# Patient Record
Sex: FEMALE | Race: WHITE | NOT HISPANIC OR LATINO | Employment: OTHER | URBAN - METROPOLITAN AREA
[De-identification: names, ages, dates, MRNs, and addresses within clinical notes are randomized per-mention and may not be internally consistent; named-entity substitution may affect disease eponyms.]

---

## 2022-11-16 ENCOUNTER — HOSPITAL ENCOUNTER (INPATIENT)
Facility: HOSPITAL | Age: 67
LOS: 2 days | Discharge: HOME | DRG: 189 | End: 2022-11-18
Attending: EMERGENCY MEDICINE | Admitting: HOSPITALIST
Payer: MEDICARE

## 2022-11-16 DIAGNOSIS — J96.01 ACUTE HYPOXEMIC RESPIRATORY FAILURE: ICD-10-CM

## 2022-11-16 DIAGNOSIS — J18.9 PNEUMONIA OF BOTH LOWER LOBES DUE TO INFECTIOUS ORGANISM: Primary | ICD-10-CM

## 2022-11-16 DIAGNOSIS — A41.9 SEPSIS, DUE TO UNSPECIFIED ORGANISM, UNSPECIFIED WHETHER ACUTE ORGAN DYSFUNCTION PRESENT: ICD-10-CM

## 2022-11-16 DIAGNOSIS — R06.02 SHORTNESS OF BREATH: ICD-10-CM

## 2022-11-16 PROBLEM — Z86.39 HISTORY OF GRAVES' DISEASE: Status: ACTIVE | Noted: 2018-03-16

## 2022-11-16 PROBLEM — D50.9 IRON DEFICIENCY ANEMIA: Status: ACTIVE | Noted: 2022-11-16

## 2022-11-16 PROBLEM — I73.9 PAD (PERIPHERAL ARTERY DISEASE): Status: ACTIVE | Noted: 2019-01-24

## 2022-11-16 PROBLEM — G47.33 OSA (OBSTRUCTIVE SLEEP APNEA): Status: ACTIVE | Noted: 2019-01-10

## 2022-11-16 PROBLEM — E78.00 PURE HYPERCHOLESTEROLEMIA: Status: ACTIVE | Noted: 2018-10-29

## 2022-11-16 PROBLEM — I50.32 CHRONIC DIASTOLIC (CONGESTIVE) HEART FAILURE: Status: ACTIVE | Noted: 2018-10-29

## 2022-11-16 PROBLEM — I73.9 PERIPHERAL VASCULAR DISEASE: Status: ACTIVE | Noted: 2022-11-16

## 2022-11-16 PROBLEM — J44.1 CHRONIC OBSTRUCTIVE PULMONARY DISEASE WITH ACUTE EXACERBATION: Status: ACTIVE | Noted: 2021-10-15

## 2022-11-16 LAB
ALBUMIN SERPL BCP-MCNC: 2.8 G/DL (ref 3.5–5.2)
ALLENS TEST: ABNORMAL
ALP SERPL-CCNC: 52 U/L (ref 55–135)
ALT SERPL W/O P-5'-P-CCNC: 41 U/L (ref 10–44)
ANION GAP SERPL CALC-SCNC: 12 MMOL/L (ref 8–16)
APTT BLDCRRT: 29.1 SEC (ref 21–32)
AST SERPL-CCNC: 71 U/L (ref 10–40)
BACTERIA #/AREA URNS HPF: ABNORMAL /HPF
BASOPHILS # BLD AUTO: 0.01 K/UL (ref 0–0.2)
BASOPHILS NFR BLD: 0.1 % (ref 0–1.9)
BILIRUB SERPL-MCNC: 0.5 MG/DL (ref 0.1–1)
BILIRUB UR QL STRIP: NEGATIVE
BNP SERPL-MCNC: 32 PG/ML (ref 0–99)
BUN SERPL-MCNC: 38 MG/DL (ref 8–23)
CALCIUM SERPL-MCNC: 8.7 MG/DL (ref 8.7–10.5)
CHLORIDE SERPL-SCNC: 98 MMOL/L (ref 95–110)
CLARITY UR: CLEAR
CO2 SERPL-SCNC: 22 MMOL/L (ref 23–29)
COLOR UR: YELLOW
CREAT SERPL-MCNC: 1.5 MG/DL (ref 0.5–1.4)
DELSYS: ABNORMAL
DIFFERENTIAL METHOD: ABNORMAL
EOSINOPHIL # BLD AUTO: 0 K/UL (ref 0–0.5)
EOSINOPHIL NFR BLD: 0 % (ref 0–8)
ERYTHROCYTE [DISTWIDTH] IN BLOOD BY AUTOMATED COUNT: 17.4 % (ref 11.5–14.5)
EST. GFR  (NO RACE VARIABLE): 38 ML/MIN/1.73 M^2
FIO2: 36
FLOW: 4
GLUCOSE SERPL-MCNC: 123 MG/DL (ref 70–110)
GLUCOSE UR QL STRIP: NEGATIVE
HCO3 UR-SCNC: 25.4 MMOL/L (ref 24–28)
HCT VFR BLD AUTO: 31.7 % (ref 37–48.5)
HGB BLD-MCNC: 9.9 G/DL (ref 12–16)
HGB UR QL STRIP: ABNORMAL
HYALINE CASTS #/AREA URNS LPF: 3 /LPF
IMM GRANULOCYTES # BLD AUTO: 0.05 K/UL (ref 0–0.04)
IMM GRANULOCYTES NFR BLD AUTO: 0.6 % (ref 0–0.5)
INFLUENZA A, MOLECULAR: NEGATIVE
INFLUENZA B, MOLECULAR: NEGATIVE
INR PPP: 1 (ref 0.8–1.2)
KETONES UR QL STRIP: NEGATIVE
LACTATE SERPL-SCNC: 1 MMOL/L (ref 0.5–2.2)
LACTATE SERPL-SCNC: 1.1 MMOL/L (ref 0.5–2.2)
LEUKOCYTE ESTERASE UR QL STRIP: NEGATIVE
LYMPHOCYTES # BLD AUTO: 0.8 K/UL (ref 1–4.8)
LYMPHOCYTES NFR BLD: 9.4 % (ref 18–48)
MAGNESIUM SERPL-MCNC: 2.3 MG/DL (ref 1.6–2.6)
MCH RBC QN AUTO: 23.8 PG (ref 27–31)
MCHC RBC AUTO-ENTMCNC: 31.2 G/DL (ref 32–36)
MCV RBC AUTO: 76 FL (ref 82–98)
MICROSCOPIC COMMENT: ABNORMAL
MODE: ABNORMAL
MONOCYTES # BLD AUTO: 0.9 K/UL (ref 0.3–1)
MONOCYTES NFR BLD: 10.9 % (ref 4–15)
NEUTROPHILS # BLD AUTO: 6.7 K/UL (ref 1.8–7.7)
NEUTROPHILS NFR BLD: 79 % (ref 38–73)
NITRITE UR QL STRIP: NEGATIVE
NRBC BLD-RTO: 0 /100 WBC
PCO2 BLDA: 34 MMHG (ref 35–45)
PH SMN: 7.48 [PH] (ref 7.35–7.45)
PH UR STRIP: 6 [PH] (ref 5–8)
PLATELET # BLD AUTO: 215 K/UL (ref 150–450)
PMV BLD AUTO: 10.7 FL (ref 9.2–12.9)
PO2 BLDA: 63 MMHG (ref 80–100)
POC BE: 2 MMOL/L
POC SATURATED O2: 94 % (ref 95–100)
POCT GLUCOSE: 128 MG/DL (ref 70–110)
POCT GLUCOSE: 130 MG/DL (ref 70–110)
POTASSIUM SERPL-SCNC: 3.6 MMOL/L (ref 3.5–5.1)
PROCALCITONIN SERPL IA-MCNC: 2.1 NG/ML
PROT SERPL-MCNC: 7.5 G/DL (ref 6–8.4)
PROT UR QL STRIP: ABNORMAL
PROTHROMBIN TIME: 10.3 SEC (ref 9–12.5)
RBC # BLD AUTO: 4.16 M/UL (ref 4–5.4)
RBC #/AREA URNS HPF: 2 /HPF (ref 0–4)
SAMPLE: ABNORMAL
SARS-COV-2 RDRP RESP QL NAA+PROBE: NEGATIVE
SITE: ABNORMAL
SODIUM SERPL-SCNC: 132 MMOL/L (ref 136–145)
SP GR UR STRIP: 1.01 (ref 1–1.03)
SPECIMEN SOURCE: NORMAL
TROPONIN I SERPL DL<=0.01 NG/ML-MCNC: 0.02 NG/ML (ref 0–0.03)
UNIDENT CRYS URNS QL MICRO: ABNORMAL
URN SPEC COLLECT METH UR: ABNORMAL
UROBILINOGEN UR STRIP-ACNC: NEGATIVE EU/DL
WBC # BLD AUTO: 8.51 K/UL (ref 3.9–12.7)
WBC #/AREA URNS HPF: 3 /HPF (ref 0–5)
WBC CLUMPS URNS QL MICRO: ABNORMAL

## 2022-11-16 PROCEDURE — 94761 N-INVAS EAR/PLS OXIMETRY MLT: CPT

## 2022-11-16 PROCEDURE — 87502 INFLUENZA DNA AMP PROBE: CPT | Performed by: EMERGENCY MEDICINE

## 2022-11-16 PROCEDURE — 87040 BLOOD CULTURE FOR BACTERIA: CPT | Mod: 59 | Performed by: EMERGENCY MEDICINE

## 2022-11-16 PROCEDURE — 99900035 HC TECH TIME PER 15 MIN (STAT)

## 2022-11-16 PROCEDURE — 93005 ELECTROCARDIOGRAM TRACING: CPT

## 2022-11-16 PROCEDURE — 82800 BLOOD PH: CPT

## 2022-11-16 PROCEDURE — 87205 SMEAR GRAM STAIN: CPT | Performed by: EMERGENCY MEDICINE

## 2022-11-16 PROCEDURE — 21400001 HC TELEMETRY ROOM

## 2022-11-16 PROCEDURE — 85025 COMPLETE CBC W/AUTO DIFF WBC: CPT | Performed by: EMERGENCY MEDICINE

## 2022-11-16 PROCEDURE — 85610 PROTHROMBIN TIME: CPT | Performed by: EMERGENCY MEDICINE

## 2022-11-16 PROCEDURE — 63600175 PHARM REV CODE 636 W HCPCS: Performed by: EMERGENCY MEDICINE

## 2022-11-16 PROCEDURE — 81000 URINALYSIS NONAUTO W/SCOPE: CPT | Performed by: EMERGENCY MEDICINE

## 2022-11-16 PROCEDURE — 96361 HYDRATE IV INFUSION ADD-ON: CPT

## 2022-11-16 PROCEDURE — 36600 WITHDRAWAL OF ARTERIAL BLOOD: CPT

## 2022-11-16 PROCEDURE — 25000242 PHARM REV CODE 250 ALT 637 W/ HCPCS: Performed by: NURSE PRACTITIONER

## 2022-11-16 PROCEDURE — 83880 ASSAY OF NATRIURETIC PEPTIDE: CPT | Performed by: EMERGENCY MEDICINE

## 2022-11-16 PROCEDURE — 85730 THROMBOPLASTIN TIME PARTIAL: CPT | Performed by: EMERGENCY MEDICINE

## 2022-11-16 PROCEDURE — 83735 ASSAY OF MAGNESIUM: CPT | Performed by: EMERGENCY MEDICINE

## 2022-11-16 PROCEDURE — 99291 CRITICAL CARE FIRST HOUR: CPT | Mod: 25

## 2022-11-16 PROCEDURE — 84484 ASSAY OF TROPONIN QUANT: CPT | Performed by: EMERGENCY MEDICINE

## 2022-11-16 PROCEDURE — 25000003 PHARM REV CODE 250: Performed by: NURSE PRACTITIONER

## 2022-11-16 PROCEDURE — 96365 THER/PROPH/DIAG IV INF INIT: CPT | Mod: 59

## 2022-11-16 PROCEDURE — 93010 ELECTROCARDIOGRAM REPORT: CPT | Mod: ,,, | Performed by: INTERNAL MEDICINE

## 2022-11-16 PROCEDURE — 25000003 PHARM REV CODE 250: Performed by: EMERGENCY MEDICINE

## 2022-11-16 PROCEDURE — 82803 BLOOD GASES ANY COMBINATION: CPT

## 2022-11-16 PROCEDURE — 63600175 PHARM REV CODE 636 W HCPCS: Performed by: NURSE PRACTITIONER

## 2022-11-16 PROCEDURE — 94640 AIRWAY INHALATION TREATMENT: CPT

## 2022-11-16 PROCEDURE — 84145 PROCALCITONIN (PCT): CPT | Performed by: EMERGENCY MEDICINE

## 2022-11-16 PROCEDURE — 80053 COMPREHEN METABOLIC PANEL: CPT | Performed by: EMERGENCY MEDICINE

## 2022-11-16 PROCEDURE — 87070 CULTURE OTHR SPECIMN AEROBIC: CPT | Performed by: EMERGENCY MEDICINE

## 2022-11-16 PROCEDURE — 94660 CPAP INITIATION&MGMT: CPT

## 2022-11-16 PROCEDURE — 27000221 HC OXYGEN, UP TO 24 HOURS

## 2022-11-16 PROCEDURE — U0002 COVID-19 LAB TEST NON-CDC: HCPCS | Performed by: EMERGENCY MEDICINE

## 2022-11-16 PROCEDURE — 93010 EKG 12-LEAD: ICD-10-PCS | Mod: ,,, | Performed by: INTERNAL MEDICINE

## 2022-11-16 PROCEDURE — 27000190 HC CPAP FULL FACE MASK W/VALVE

## 2022-11-16 PROCEDURE — 83605 ASSAY OF LACTIC ACID: CPT | Performed by: EMERGENCY MEDICINE

## 2022-11-16 RX ORDER — BUTALBITAL, ACETAMINOPHEN AND CAFFEINE 50; 325; 40 MG/1; MG/1; MG/1
1 TABLET ORAL EVERY 6 HOURS PRN
COMMUNITY
Start: 2022-08-17

## 2022-11-16 RX ORDER — ATORVASTATIN CALCIUM 40 MG/1
40 TABLET, FILM COATED ORAL NIGHTLY
Status: ON HOLD | COMMUNITY
Start: 2022-11-14 | End: 2022-11-18 | Stop reason: HOSPADM

## 2022-11-16 RX ORDER — ONDANSETRON 2 MG/ML
4 INJECTION INTRAMUSCULAR; INTRAVENOUS
Status: COMPLETED | OUTPATIENT
Start: 2022-11-16 | End: 2022-11-16

## 2022-11-16 RX ORDER — LANOLIN ALCOHOL/MO/W.PET/CERES
1 CREAM (GRAM) TOPICAL DAILY
Status: DISCONTINUED | OUTPATIENT
Start: 2022-11-16 | End: 2022-11-18 | Stop reason: HOSPADM

## 2022-11-16 RX ORDER — HYDROCODONE BITARTRATE AND ACETAMINOPHEN 10; 325 MG/1; MG/1
1 TABLET ORAL EVERY 6 HOURS PRN
Status: DISCONTINUED | OUTPATIENT
Start: 2022-11-16 | End: 2022-11-18 | Stop reason: HOSPADM

## 2022-11-16 RX ORDER — ARFORMOTEROL TARTRATE 15 UG/2ML
15 SOLUTION RESPIRATORY (INHALATION) EVERY 12 HOURS
Status: DISCONTINUED | OUTPATIENT
Start: 2022-11-16 | End: 2022-11-18 | Stop reason: HOSPADM

## 2022-11-16 RX ORDER — BUDESONIDE 0.5 MG/2ML
0.5 INHALANT ORAL EVERY 12 HOURS
Status: DISCONTINUED | OUTPATIENT
Start: 2022-11-16 | End: 2022-11-18 | Stop reason: HOSPADM

## 2022-11-16 RX ORDER — NAPROXEN SODIUM 220 MG/1
81 TABLET, FILM COATED ORAL DAILY
Status: DISCONTINUED | OUTPATIENT
Start: 2022-11-16 | End: 2022-11-18 | Stop reason: HOSPADM

## 2022-11-16 RX ORDER — IBUPROFEN 200 MG
24 TABLET ORAL
Status: DISCONTINUED | OUTPATIENT
Start: 2022-11-16 | End: 2022-11-18 | Stop reason: HOSPADM

## 2022-11-16 RX ORDER — INSULIN ASPART 100 [IU]/ML
1-10 INJECTION, SOLUTION INTRAVENOUS; SUBCUTANEOUS
Status: DISCONTINUED | OUTPATIENT
Start: 2022-11-16 | End: 2022-11-18 | Stop reason: HOSPADM

## 2022-11-16 RX ORDER — ENOXAPARIN SODIUM 100 MG/ML
40 INJECTION SUBCUTANEOUS EVERY 24 HOURS
Status: DISCONTINUED | OUTPATIENT
Start: 2022-11-16 | End: 2022-11-18 | Stop reason: HOSPADM

## 2022-11-16 RX ORDER — TALC
6 POWDER (GRAM) TOPICAL NIGHTLY PRN
Status: DISCONTINUED | OUTPATIENT
Start: 2022-11-16 | End: 2022-11-18 | Stop reason: HOSPADM

## 2022-11-16 RX ORDER — BIMATOPROST 0.1 MG/ML
1 SOLUTION/ DROPS OPHTHALMIC NIGHTLY
COMMUNITY
Start: 2022-06-15

## 2022-11-16 RX ORDER — FLUTICASONE PROPIONATE AND SALMETEROL 100; 50 UG/1; UG/1
1 POWDER RESPIRATORY (INHALATION) 2 TIMES DAILY
COMMUNITY
Start: 2022-01-14

## 2022-11-16 RX ORDER — ESCITALOPRAM OXALATE 10 MG/1
10 TABLET ORAL DAILY
COMMUNITY
Start: 2022-10-13

## 2022-11-16 RX ORDER — ROPINIROLE 1 MG/1
1 TABLET, FILM COATED ORAL 3 TIMES DAILY
COMMUNITY
Start: 2022-07-22

## 2022-11-16 RX ORDER — HYDROCODONE BITARTRATE AND ACETAMINOPHEN 10; 325 MG/1; MG/1
1 TABLET ORAL EVERY 8 HOURS PRN
COMMUNITY
Start: 2022-09-07

## 2022-11-16 RX ORDER — SODIUM CHLORIDE 0.9 % (FLUSH) 0.9 %
10 SYRINGE (ML) INJECTION EVERY 12 HOURS PRN
Status: DISCONTINUED | OUTPATIENT
Start: 2022-11-16 | End: 2022-11-18 | Stop reason: HOSPADM

## 2022-11-16 RX ORDER — LAMOTRIGINE 100 MG/1
100 TABLET ORAL 3 TIMES DAILY
COMMUNITY
Start: 2022-07-29

## 2022-11-16 RX ORDER — ACETAMINOPHEN 325 MG/1
650 TABLET ORAL EVERY 8 HOURS PRN
Status: DISCONTINUED | OUTPATIENT
Start: 2022-11-16 | End: 2022-11-18 | Stop reason: HOSPADM

## 2022-11-16 RX ORDER — ARFORMOTEROL TARTRATE 15 UG/2ML
15 SOLUTION RESPIRATORY (INHALATION) EVERY 12 HOURS
Status: DISCONTINUED | OUTPATIENT
Start: 2022-11-16 | End: 2022-11-16

## 2022-11-16 RX ORDER — ONDANSETRON 2 MG/ML
4 INJECTION INTRAMUSCULAR; INTRAVENOUS EVERY 8 HOURS PRN
Status: DISCONTINUED | OUTPATIENT
Start: 2022-11-16 | End: 2022-11-18 | Stop reason: HOSPADM

## 2022-11-16 RX ORDER — METOPROLOL TARTRATE 50 MG/1
50 TABLET ORAL 2 TIMES DAILY
COMMUNITY

## 2022-11-16 RX ORDER — METFORMIN HYDROCHLORIDE 500 MG/1
500 TABLET ORAL DAILY
COMMUNITY
Start: 2022-10-13

## 2022-11-16 RX ORDER — LEVOTHYROXINE SODIUM 112 UG/1
112 TABLET ORAL DAILY
COMMUNITY
Start: 2022-11-03

## 2022-11-16 RX ORDER — LEVOTHYROXINE SODIUM 112 UG/1
112 TABLET ORAL
Status: DISCONTINUED | OUTPATIENT
Start: 2022-11-17 | End: 2022-11-18 | Stop reason: HOSPADM

## 2022-11-16 RX ORDER — CLOPIDOGREL BISULFATE 75 MG/1
75 TABLET ORAL DAILY
Status: DISCONTINUED | OUTPATIENT
Start: 2022-11-16 | End: 2022-11-18 | Stop reason: HOSPADM

## 2022-11-16 RX ORDER — GABAPENTIN 100 MG/1
100 CAPSULE ORAL 3 TIMES DAILY
Status: DISCONTINUED | OUTPATIENT
Start: 2022-11-16 | End: 2022-11-18 | Stop reason: HOSPADM

## 2022-11-16 RX ORDER — ATORVASTATIN CALCIUM 40 MG/1
40 TABLET, FILM COATED ORAL NIGHTLY
Status: DISCONTINUED | OUTPATIENT
Start: 2022-11-16 | End: 2022-11-18

## 2022-11-16 RX ORDER — GLUCAGON 1 MG
1 KIT INJECTION
Status: DISCONTINUED | OUTPATIENT
Start: 2022-11-16 | End: 2022-11-18 | Stop reason: HOSPADM

## 2022-11-16 RX ORDER — ALBUTEROL SULFATE 0.83 MG/ML
2.5 SOLUTION RESPIRATORY (INHALATION) EVERY 4 HOURS PRN
Status: DISCONTINUED | OUTPATIENT
Start: 2022-11-16 | End: 2022-11-18 | Stop reason: HOSPADM

## 2022-11-16 RX ORDER — INDOMETHACIN 25 MG/1
25 CAPSULE ORAL 2 TIMES DAILY
COMMUNITY
Start: 2022-10-13

## 2022-11-16 RX ORDER — GABAPENTIN 300 MG/1
300 CAPSULE ORAL 3 TIMES DAILY
COMMUNITY
Start: 2022-10-14

## 2022-11-16 RX ORDER — NAPROXEN SODIUM 220 MG/1
81 TABLET, FILM COATED ORAL DAILY
COMMUNITY

## 2022-11-16 RX ORDER — BUDESONIDE 0.5 MG/2ML
0.5 INHALANT ORAL EVERY 12 HOURS
Status: DISCONTINUED | OUTPATIENT
Start: 2022-11-16 | End: 2022-11-16

## 2022-11-16 RX ORDER — LOSARTAN POTASSIUM 100 MG/1
100 TABLET ORAL DAILY
COMMUNITY
Start: 2022-11-07

## 2022-11-16 RX ORDER — CLOPIDOGREL BISULFATE 75 MG/1
75 TABLET ORAL DAILY
COMMUNITY
Start: 2022-10-13

## 2022-11-16 RX ORDER — HYDROCODONE BITARTRATE AND ACETAMINOPHEN 5; 325 MG/1; MG/1
1 TABLET ORAL EVERY 6 HOURS PRN
Status: DISCONTINUED | OUTPATIENT
Start: 2022-11-16 | End: 2022-11-18 | Stop reason: HOSPADM

## 2022-11-16 RX ORDER — NALOXONE HCL 0.4 MG/ML
0.02 VIAL (ML) INJECTION
Status: DISCONTINUED | OUTPATIENT
Start: 2022-11-16 | End: 2022-11-18 | Stop reason: HOSPADM

## 2022-11-16 RX ORDER — AMLODIPINE BESYLATE 5 MG/1
5 TABLET ORAL DAILY
COMMUNITY
Start: 2022-10-13

## 2022-11-16 RX ORDER — IBUPROFEN 200 MG
16 TABLET ORAL
Status: DISCONTINUED | OUTPATIENT
Start: 2022-11-16 | End: 2022-11-18 | Stop reason: HOSPADM

## 2022-11-16 RX ORDER — FUROSEMIDE 40 MG/1
40 TABLET ORAL DAILY
COMMUNITY
Start: 2022-11-14

## 2022-11-16 RX ADMIN — ALBUTEROL SULFATE 2.5 MG: 2.5 SOLUTION RESPIRATORY (INHALATION) at 11:11

## 2022-11-16 RX ADMIN — ASPIRIN 81 MG CHEWABLE TABLET 81 MG: 81 TABLET CHEWABLE at 12:11

## 2022-11-16 RX ADMIN — BUDESONIDE 0.5 MG: 0.5 INHALANT ORAL at 07:11

## 2022-11-16 RX ADMIN — METHYLPREDNISOLONE SODIUM SUCCINATE 80 MG: 40 INJECTION, POWDER, FOR SOLUTION INTRAMUSCULAR; INTRAVENOUS at 09:11

## 2022-11-16 RX ADMIN — ONDANSETRON 4 MG: 2 INJECTION INTRAMUSCULAR; INTRAVENOUS at 06:11

## 2022-11-16 RX ADMIN — ARFORMOTEROL TARTRATE 15 MCG: 15 SOLUTION RESPIRATORY (INHALATION) at 11:11

## 2022-11-16 RX ADMIN — SODIUM CHLORIDE 500 ML: 0.9 INJECTION, SOLUTION INTRAVENOUS at 06:11

## 2022-11-16 RX ADMIN — GABAPENTIN 100 MG: 100 CAPSULE ORAL at 09:11

## 2022-11-16 RX ADMIN — BUDESONIDE 0.5 MG: 0.5 INHALANT ORAL at 11:11

## 2022-11-16 RX ADMIN — SODIUM CHLORIDE 500 ML: 0.9 INJECTION, SOLUTION INTRAVENOUS at 08:11

## 2022-11-16 RX ADMIN — INSULIN ASPART 4 UNITS: 100 INJECTION, SOLUTION INTRAVENOUS; SUBCUTANEOUS at 05:11

## 2022-11-16 RX ADMIN — CLOPIDOGREL BISULFATE 75 MG: 75 TABLET ORAL at 12:11

## 2022-11-16 RX ADMIN — FERROUS SULFATE TAB 325 MG (65 MG ELEMENTAL FE) 1 EACH: 325 (65 FE) TAB at 12:11

## 2022-11-16 RX ADMIN — CEFTRIAXONE 1 G: 1 INJECTION, SOLUTION INTRAVENOUS at 06:11

## 2022-11-16 RX ADMIN — METHYLPREDNISOLONE SODIUM SUCCINATE 80 MG: 40 INJECTION, POWDER, FOR SOLUTION INTRAMUSCULAR; INTRAVENOUS at 10:11

## 2022-11-16 RX ADMIN — GABAPENTIN 100 MG: 100 CAPSULE ORAL at 03:11

## 2022-11-16 RX ADMIN — ENOXAPARIN SODIUM 40 MG: 100 INJECTION SUBCUTANEOUS at 05:11

## 2022-11-16 RX ADMIN — ATORVASTATIN CALCIUM 40 MG: 40 TABLET, FILM COATED ORAL at 09:11

## 2022-11-16 RX ADMIN — AZITHROMYCIN MONOHYDRATE 500 MG: 500 INJECTION, POWDER, LYOPHILIZED, FOR SOLUTION INTRAVENOUS at 06:11

## 2022-11-16 RX ADMIN — ARFORMOTEROL TARTRATE 15 MCG: 15 SOLUTION RESPIRATORY (INHALATION) at 07:11

## 2022-11-16 NOTE — ASSESSMENT & PLAN NOTE
Patient with Hypoxic Respiratory failure which is Acute.  she is not on home oxygen. 69% SpO2 reportedly at home. Arrived with NRB per EMS. Supplemental oxygen provided- currently on 5 L to maintain o2 sats >93%. Signs/symptoms of respiratory failure include- tachypnea, increased work of breathing and respiratory distress. Contributing diagnoses includes - COPD and Bilateral Pneumonia. Labs and images were reviewed.Has recent ABG, which has been reviewed. Will treat underlying causes and adjust management of respiratory failure as needed.  Recent Labs     11/16/22  0624   PH 7.482*   PCO2 34.0*   PO2 63*   HCO3 25.4   POCSATURATED 94*   BE 2

## 2022-11-16 NOTE — H&P
[facility] - Emergency Dept.  [facility]  History & Physical    Patient Name: Chiquita Ridley  MRN: [id number]  Patient Class: IP- Inpatient  Admission Date: 11/16/2022  Attending Physician: Mook Coburn MD   Primary Care Provider: Virgil Persaud MD         Patient information was obtained from patient, relative(s), EMS personnel, past medical records and ER records.     Subjective:     Principal Problem:Pneumonia of both lower lobes due to infectious organism    Chief Complaint:   Chief Complaint   Patient presents with    Shortness of Breath     Per [facility], patient c/o SOB x 2 days with fever, reports patient was tested for covid and flu (both negative) 2 days ago        HPI: Chiquita Ridley is a 66 year old female with PMHx of of chf, cad, hypothyroid, chronic anemia, copd, former smoker who presented to the Emergency Department this morning with shortness of breath, cough, and fever. Symptoms have been present for 1 week. Was seen at her PCP with cough and shortness of breath on 11/11, she tested negative for flu and COVID and was diagnosed with an upper respiratory infection and mild COPD exacerbation, was given trelegy sample and phenergan DM. Contacted PCP 2 days ago when she developed fever and was placed on prednisone taper, however symptoms worsened and ultimately led her to contacting 911. Family report her SpO2 saturations were 69-70% this morning and her temperature was 102.7. She presented via ambulance on a NRB. Symptoms have been constant and progressively worsening. Prior treatment includes albuterol, Trelegy, prednisone, phenergan DM, tylenol.      In Emergency Department noted to have hypoxic respiratory failure and bilateral pneumonia on CXR . Labs revealed normal lactic acid, urine uninfected, mild hyponatremia with Na 132, PATRICIA with BUN 38 and Creat 1.5, procalcitonin 2.10, slight anemia with H&H 9.9/ 31.7, troponin negative, blood cultures collected, BNP pending. Patient admitted to hospital  medicine services for further treatment and evaluation.       Past Medical History:   Diagnosis Date    Allergy     Arthritis     Diabetes mellitus, type 2     Hyperlipidemia     Hypertension        Past Surgical History:   Procedure Laterality Date    CAROTID STENT      HYSTERECTOMY         Review of patient's allergies indicates:   Allergen Reactions    Latex, natural rubber      Other reaction(s): swelling    Sulfa (sulfonamide antibiotics)        No current facility-administered medications on file prior to encounter.     Current Outpatient Medications on File Prior to Encounter   Medication Sig    bimatoprost (LUMIGAN) 0.01 % Drop Apply 1 drop to eye every evening.    fluticasone-salmeterol diskus inhaler 100-50 mcg Inhale 1 puff into the lungs 2 (two) times a day.    albuterol 90 mcg/actuation inhaler Inhale 2 puffs into the lungs every 4 to 6 hours as needed for Wheezing.    amLODIPine (NORVASC) 5 MG tablet Take 5 mg by mouth once daily.    aspirin 81 MG Chew Take 81 mg by mouth once daily.    atorvastatin (LIPITOR) 40 MG tablet Take 40 mg by mouth every evening.    butalbital-acetaminophen-caffeine -40 mg (FIORICET, ESGIC) -40 mg per tablet Take 1 tablet by mouth every 6 (six) hours as needed for Headaches.    clopidogreL (PLAVIX) 75 mg tablet Take 75 mg by mouth once daily.    enalapril (VASOTEC) 10 MG tablet Take 10 mg by mouth once daily.    EScitalopram oxalate (LEXAPRO) 10 MG tablet Take 10 mg by mouth once daily.    FERROUS SULFATE, DRIED (IRON, DRIED, ORAL) Take by mouth.    furosemide (LASIX) 40 MG tablet Take 40 mg by mouth once daily.    gabapentin (NEURONTIN) 300 MG capsule Take 300 mg by mouth 3 (three) times daily.    HYDROcodone-acetaminophen (NORCO)  mg per tablet Take 1 tablet by mouth every 8 (eight) hours as needed.    indomethacin (INDOCIN) 25 MG capsule Take 25 mg by mouth 2 (two) times daily.    lamoTRIgine (LAMICTAL) 100 MG tablet Take 100 mg by mouth 3 (three) times  daily.    levothyroxine (SYNTHROID) 112 MCG tablet Take 112 mcg by mouth once daily.    losartan (COZAAR) 100 MG tablet Take 100 mg by mouth once daily.    meloxicam (MOBIC) 7.5 MG tablet Take 7.5 mg by mouth once daily.    metFORMIN (GLUCOPHAGE) 500 MG tablet Take 500 mg by mouth once daily.    metoprolol succinate (TOPROL-XL) 25 MG 24 hr tablet Take 25 mg by mouth once daily.    metoprolol tartrate (LOPRESSOR) 50 MG tablet Take 50 mg by mouth 2 (two) times a day.    rOPINIRole (REQUIP) 1 MG tablet Take 1 mg by mouth 3 (three) times daily.    SPIRONOLACTONE ORAL Take by mouth.    zolpidem (AMBIEN) 10 mg Tab Take 5 mg by mouth nightly as needed.    [DISCONTINUED] predniSONE (DELTASONE) 20 MG tablet Take 2 tab daily x 3 days, 1 tab daily x 3 days, then 1/2 tab daily x 2 days.     Family History       Problem Relation (Age of Onset)    Asthma Daughter    Heart disease Mother    Parkinsonism Father    Stroke Father          Tobacco Use    Smoking status: Every Day    Smokeless tobacco: Never   Substance and Sexual Activity    Alcohol use: No    Drug use: No    Sexual activity: Yes     Review of Systems   Constitutional:  Positive for activity change, appetite change, chills, fatigue and fever.   HENT:  Positive for congestion. Negative for rhinorrhea, sneezing, sore throat and trouble swallowing.    Respiratory:  Positive for cough, chest tightness, shortness of breath and wheezing.    Cardiovascular:  Positive for leg swelling (chronic). Negative for chest pain and palpitations.   Gastrointestinal:  Negative for abdominal pain, constipation, diarrhea, nausea and vomiting.   Genitourinary: Negative.    Musculoskeletal:  Positive for myalgias. Negative for gait problem and joint swelling.   Neurological:  Positive for weakness (generalized) and headaches. Negative for dizziness, tremors and speech difficulty.   Psychiatric/Behavioral:  Negative for agitation, behavioral problems and confusion. The patient is not  nervous/anxious.    Objective:     Vital Signs (Most Recent):  Temp: 98.6 °F (37 °C) (11/16/22 0830)  Pulse: (!) 58 (11/16/22 1030)  Resp: 17 (11/16/22 1030)  BP: (!) 119/58 (11/16/22 1030)  SpO2: 96 % (11/16/22 1030)   Vital Signs (24h Range):  Temp:  [98.6 °F (37 °C)-100.4 °F (38 °C)] 98.6 °F (37 °C)  Pulse:  [53-74] 58  Resp:  [17-30] 17  SpO2:  [91 %-97 %] 96 %  BP: ()/(50-67) 119/58     Weight: 109.1 kg (240 lb 9.6 oz)  Body mass index is 37.68 kg/m².    Physical Exam  Vitals and nursing note reviewed.   Constitutional:       General: She is awake. She is in acute distress.      Appearance: She is morbidly obese. She is ill-appearing.      Comments: On BIPAP   HENT:      Head: Normocephalic and atraumatic.   Eyes:      Extraocular Movements: Extraocular movements intact.      Comments: +bilateral Exophthalmos   Cardiovascular:      Rate and Rhythm: Tachycardia present. Rhythm irregular.      Heart sounds: Normal heart sounds.      Comments: +PVCs noted on bedside cardiac monitor  Pulmonary:      Effort: Tachypnea and accessory muscle usage present.      Breath sounds: Transmitted upper airway sounds present. Wheezing and rhonchi present.   Abdominal:      General: Abdomen is protuberant.      Tenderness: There is no abdominal tenderness.   Genitourinary:     Comments: deferred  Musculoskeletal:         General: Normal range of motion.      Cervical back: Normal range of motion.      Right lower leg: Edema present.   Skin:     General: Skin is warm and dry.      Capillary Refill: Capillary refill takes 2 to 3 seconds.   Neurological:      Mental Status: She is alert and oriented to person, place, and time.   Psychiatric:         Attention and Perception: Attention normal.         Mood and Affect: Mood normal.         Behavior: Behavior is cooperative.           Significant Labs: All pertinent labs within the past 24 hours have been reviewed.  Results for orders placed or performed during the hospital  encounter of 11/16/22   Influenza A & B by Molecular    Specimen: Nasopharyngeal Swab   Result Value Ref Range    Influenza A, Molecular Negative Negative    Influenza B, Molecular Negative Negative    Flu A & B Source Nasal swab    CBC auto differential   Result Value Ref Range    WBC 8.51 3.90 - 12.70 K/uL    RBC 4.16 4.00 - 5.40 M/uL    Hemoglobin 9.9 (L) 12.0 - 16.0 g/dL    Hematocrit 31.7 (L) 37.0 - 48.5 %    MCV 76 (L) 82 - 98 fL    MCH 23.8 (L) 27.0 - 31.0 pg    MCHC 31.2 (L) 32.0 - 36.0 g/dL    RDW 17.4 (H) 11.5 - 14.5 %    Platelets 215 150 - 450 K/uL    MPV 10.7 9.2 - 12.9 fL    Immature Granulocytes 0.6 (H) 0.0 - 0.5 %    Gran # (ANC) 6.7 1.8 - 7.7 K/uL    Immature Grans (Abs) 0.05 (H) 0.00 - 0.04 K/uL    Lymph # 0.8 (L) 1.0 - 4.8 K/uL    Mono # 0.9 0.3 - 1.0 K/uL    Eos # 0.0 0.0 - 0.5 K/uL    Baso # 0.01 0.00 - 0.20 K/uL    nRBC 0 0 /100 WBC    Gran % 79.0 (H) 38.0 - 73.0 %    Lymph % 9.4 (L) 18.0 - 48.0 %    Mono % 10.9 4.0 - 15.0 %    Eosinophil % 0.0 0.0 - 8.0 %    Basophil % 0.1 0.0 - 1.9 %    Differential Method Automated    Comprehensive metabolic panel   Result Value Ref Range    Sodium 132 (L) 136 - 145 mmol/L    Potassium 3.6 3.5 - 5.1 mmol/L    Chloride 98 95 - 110 mmol/L    CO2 22 (L) 23 - 29 mmol/L    Glucose 123 (H) 70 - 110 mg/dL    BUN 38 (H) 8 - 23 mg/dL    Creatinine 1.5 (H) 0.5 - 1.4 mg/dL    Calcium 8.7 8.7 - 10.5 mg/dL    Total Protein 7.5 6.0 - 8.4 g/dL    Albumin 2.8 (L) 3.5 - 5.2 g/dL    Total Bilirubin 0.5 0.1 - 1.0 mg/dL    Alkaline Phosphatase 52 (L) 55 - 135 U/L    AST 71 (H) 10 - 40 U/L    ALT 41 10 - 44 U/L    Anion Gap 12 8 - 16 mmol/L    eGFR 38 (A) >60 mL/min/1.73 m^2   Lactic acid, plasma #1   Result Value Ref Range    Lactate (Lactic Acid) 1.1 0.5 - 2.2 mmol/L   Urinalysis, Reflex to Urine Culture Urine, Clean Catch    Specimen: Urine   Result Value Ref Range    Specimen UA Urine, Clean Catch     Color, UA Yellow Yellow, Straw, Jolene    Appearance, UA Clear Clear    pH,  UA 6.0 5.0 - 8.0    Specific Gravity, UA 1.015 1.005 - 1.030    Protein, UA 1+ (A) Negative    Glucose, UA Negative Negative    Ketones, UA Negative Negative    Bilirubin (UA) Negative Negative    Occult Blood UA 1+ (A) Negative    Nitrite, UA Negative Negative    Urobilinogen, UA Negative <2.0 EU/dL    Leukocytes, UA Negative Negative   Procalcitonin   Result Value Ref Range    Procalcitonin 2.10 (H) <0.25 ng/mL   Troponin I   Result Value Ref Range    Troponin I 0.023 0.000 - 0.026 ng/mL   Brain natriuretic peptide   Result Value Ref Range    BNP 32 0 - 99 pg/mL   Protime-INR   Result Value Ref Range    Prothrombin Time 10.3 9.0 - 12.5 sec    INR 1.0 0.8 - 1.2   APTT   Result Value Ref Range    aPTT 29.1 21.0 - 32.0 sec   Magnesium   Result Value Ref Range    Magnesium 2.3 1.6 - 2.6 mg/dL   COVID-19 Rapid Screening   Result Value Ref Range    SARS-CoV-2 RNA, Amplification, Qual Negative Negative   Urinalysis Microscopic   Result Value Ref Range    RBC, UA 2 0 - 4 /hpf    WBC, UA 3 0 - 5 /hpf    WBC Clumps, UA Occasional (A) None-Rare    Bacteria None None-Occ /hpf    Hyaline Casts, UA 3 (A) 0-1/lpf /lpf    Unclass Ruba UA Rare None-Moderate    Microscopic Comment SEE COMMENT    ISTAT PROCEDURE   Result Value Ref Range    POC PH 7.482 (H) 7.35 - 7.45    POC PCO2 34.0 (L) 35 - 45 mmHg    POC PO2 63 (L) 80 - 100 mmHg    POC HCO3 25.4 24 - 28 mmol/L    POC BE 2 -2 to 2 mmol/L    POC SATURATED O2 94 (L) 95 - 100 %    Sample ARTERIAL     Site LR     Allens Test Pass     DelSys Nasal Can     Mode SPONT     Flow 4     FiO2 36    POCT glucose   Result Value Ref Range    POCT Glucose 128 (H) 70 - 110 mg/dL       Significant Imaging: I have reviewed all pertinent imaging results/findings within the past 24 hours.  Imaging Results              X-Ray Chest AP Portable (Final result)  Result time 11/16/22 07:15:23      Final result by Dean Blair MD (11/16/22 07:15:23)                   Impression:      1.  Patchy mixed  interstitial and alveolar opacities throughout the mid lower lungs bilaterally.  Bilateral pneumonia, such as community-acquired pneumonia or atypical viral pneumonia must be considered.    2.  Incidental findings as noted above.      Electronically signed by: Dean Blair MD  Date:    11/16/2022  Time:    07:15               Narrative:    EXAMINATION:  XR CHEST AP PORTABLE    CLINICAL HISTORY:  Sepsis;    COMPARISON:  No comparison studies are available.    FINDINGS:  EKG leads and oxygen tubing overlie the chest which is lordotic in position and rotated to the right.  There are patchy opacities throughout the mid lower lungs bilaterally.  The upper lungs are clear. The cardiac silhouette size is borderline enlarged.  The trachea is midline and the mediastinal width is normal. Negative for effusion or pneumothorax.  Pulmonary vasculature is normal. Negative for osseous abnormalities. Ectatic and tortuous aorta with aortic arch calcifications.  Degenerative changes of the spine and both shoulder girdles.                                        Assessment/Plan:     * Pneumonia of both lower lobes due to infectious organism  Patient with current diagnosis of pneumonia due to bacterial etiology due to  unspecified organism which is uncontrolled due to persistent hypoxia . I have reviewed the pertinent imaging. Current antimicrobial regimen consists of   Antibiotics (From admission, onward)      Start     Stop Route Frequency Ordered    11/16/22 0630  cefTRIAXone (ROCEPHIN) 1 g/50 mL D5W IVPB         -- IV Every 24 hours (non-standard times) 11/16/22 0629 11/16/22 0630  azithromycin 500 mg in dextrose 5 % 250 mL IVPB (ready to mix system)         -- IV Every 24 hours (non-standard times) 11/16/22 0629        . Cultures drawn and noted-   Microbiology Results (last 7 days)       Procedure Component Value Units Date/Time    Blood culture x two cultures. Draw prior to antibiotics. [[id number]] Collected: 11/16/22 0618     Order Status: Sent Specimen: Blood from Peripheral, Antecubital, Right Updated: 11/16/22 1042    Blood culture x two cultures. Draw prior to antibiotics. [[id number]] Collected: 11/16/22 0617    Order Status: Sent Specimen: Blood from Peripheral, Hand, Right Updated: 11/16/22 1042    Influenza A & B by Molecular [[id number]] Collected: 11/16/22 0622    Order Status: Completed Specimen: Nasopharyngeal Swab Updated: 11/16/22 0727     Influenza A, Molecular Negative     Influenza B, Molecular Negative     Flu A & B Source Nasal swab    Culture, Respiratory with Gram Stain [[id number]] Collected: 11/16/22 0712    Order Status: Sent Specimen: Respiratory from Sputum Updated: 11/16/22 0717         Will monitor patient closely and continue current treatment plan unchanged.        Acute hypoxemic respiratory failure  Patient with Hypoxic Respiratory failure which is Acute.  she is not on home oxygen. 69% SpO2 reportedly at home. Arrived with NRB per EMS. Supplemental oxygen provided- currently on 5 L to maintain o2 sats >93%. Signs/symptoms of respiratory failure include- tachypnea, increased work of breathing and respiratory distress. Contributing diagnoses includes - COPD and Bilateral Pneumonia. Labs and images were reviewed.Has recent ABG, which has been reviewed. Will treat underlying causes and adjust management of respiratory failure as needed.  Recent Labs     11/16/22 0624   PH 7.482*   PCO2 34.0*   PO2 63*   HCO3 25.4   POCSATURATED 94*   BE 2       History of Graves' disease  Patient has chronic hypothyroidism. Will continue chronic levothyroxine and adjust for and clinical changes.   Contains abnormal data TSH  Order: [id number]   Ref Range & Units 2 wk ago   TSH 0.27 - 4.20 uIU/mL 11.71 High     Resulting Agency  [facility]         Iron deficiency anemia  Continue iron supplementation  Recent Labs from PCP:     Ref Range & Units 2 wk ago   TIBC 250 - 400 ug/dL 474 High     % Saturation 20 - 50 % 6  Low     Iron 40 - 150 ug/dL 29 L Low             Type 2 diabetes mellitus without complication, without long-term current use of insulin  .Patient's FSGs are controlled on current medication regimen.  Last A1c reviewed-  Hemoglobin A1c  Specimen:  Blood - Venous structure (body structure)   Ref Range & Units 2 wk ago   Hgb A1c 4.2 - 5.8 % 6.7 High     EAG (mg/dl)  137.11    Resulting Agency  [facility]       Most recent fingerstick glucose reviewed-   Recent Labs   Lab 11/16/22  0624   POCTGLUCOSE 128*     Current correctional scale  Low  Maintain anti-hyperglycemic dose as follows-   Antihyperglycemics (From admission, onward)      Start     Stop Route Frequency Ordered    11/16/22 1154  insulin aspart U-100 pen 1-10 Units         -- SubQ Before meals & nightly PRN 11/16/22 1059          Hold Oral hypoglycemics while patient is in the hospital.    KERA (obstructive sleep apnea)  CPAP HS      Atherosclerosis of native coronary artery of native heart without angina pectoris  Patient with known CAD s/p stent placement, which is controlled Will continue ASA, Plavix and Statin and monitor for S/Sx of angina/ACS. Continue to monitor on telemetry.         Chronic obstructive pulmonary disease with acute exacerbation  With bilateral pneumonia  Pulse ox continuous  Budesonide (ICS) and formoterol (LABA) q 12 hours  Duoneb PRN q 6  PRN O2 to maintain SPO2 >94%  Solumedrol IV        Chronic diastolic (congestive) heart failure  Patient is identified as having Diastolic (HFpEF) heart failure that is Chronic. CHF is currently controlled. Latest ECHO (11/2020) demonstrated-  CONCLUSIONS:   1. Normal left ventricular cavity size. Normal left ventricular systolic function. LVEF   55 - 65%. Mild concentric hypertrophy.   2. Mildly dilated left atrium.   3. Trivial mitral regurgitation. No mitral valve stenosis. Mitral valve leaflets appear   mildly calcified.   4. No aortic valve regurgitation seen. No aortic valve  stenosis. AV peak PG 14 mmHg. AV   Mean PG 7 mmHg.   5. Trivial tricuspid valve regurgitation. The estimated right ventricular systolic   pressure/PASP is <35 mmHg.   6. Trivial pulmonic valve regurgitation.   7. Visualized portions of the aortic root and proximal aorta appear normal in size and   contour.    Continue Beta Blocker, ACE/ARB and Furosemide and monitor clinical status closely. Monitor on telemetry. Patient is off CHF pathway.  Monitor strict Is&Os and daily weights.  Place on fluid restriction of 1.5 L. Continue to stress to patient importance of self efficacy and  on diet for CHF.   BNP Pending:  Recent Labs   Lab 11/16/22 0615   BNP 32   .      Essential (primary) hypertension  Chronic, controlled-hypotensive currently.  Latest blood pressure and vitals reviewed-   Temp:  [98.6 °F (37 °C)-100.4 °F (38 °C)]   Pulse:  [53-74]   Resp:  [17-30]   BP: ()/(50-67)   SpO2:  [91 %-97 %] .   Home meds for hypertension were reviewed and noted below.   Hypertension Medications               amLODIPine (NORVASC) 5 MG tablet Take 5 mg by mouth once daily.    enalapril (VASOTEC) 10 MG tablet Take 10 mg by mouth once daily.    furosemide (LASIX) 40 MG tablet Take 40 mg by mouth once daily.    losartan (COZAAR) 100 MG tablet Take 100 mg by mouth once daily.    metoprolol succinate (TOPROL-XL) 25 MG 24 hr tablet Take 25 mg by mouth once daily.    metoprolol tartrate (LOPRESSOR) 50 MG tablet Take 50 mg by mouth 2 (two) times a day.    SPIRONOLACTONE ORAL Take by mouth.            While in the hospital, will manage blood pressure as follows; Adjust home antihypertensive regimen as follows- hold due to hypotension- restart when able    Will utilize p.r.n. blood pressure medication only if patient's systolic blood pressure greater than 180    VTE Risk Mitigation (From admission, onward)           Ordered     enoxaparin injection 40 mg  Daily         11/16/22 1059     IP VTE HIGH RISK PATIENT  Once          11/16/22 1059     Place sequential compression device  Until discontinued         11/16/22 1059                       Eileen Celis NP  Department of Hospital Medicine   '[facility] - Emergency Dept.

## 2022-11-16 NOTE — ASSESSMENT & PLAN NOTE
Patient with current diagnosis of pneumonia due to bacterial etiology due to  unspecified organism which is uncontrolled due to persistent hypoxia . I have reviewed the pertinent imaging. Current antimicrobial regimen consists of   Antibiotics (From admission, onward)    Start     Stop Route Frequency Ordered    11/16/22 0630  cefTRIAXone (ROCEPHIN) 1 g/50 mL D5W IVPB         -- IV Every 24 hours (non-standard times) 11/16/22 0629 11/16/22 0630  azithromycin 500 mg in dextrose 5 % 250 mL IVPB (ready to mix system)         -- IV Every 24 hours (non-standard times) 11/16/22 0629      . Cultures drawn and noted-   Microbiology Results (last 7 days)     Procedure Component Value Units Date/Time    Blood culture x two cultures. Draw prior to antibiotics. [[id number]] Collected: 11/16/22 0618    Order Status: Sent Specimen: Blood from Peripheral, Antecubital, Right Updated: 11/16/22 1042    Blood culture x two cultures. Draw prior to antibiotics. [[id number]] Collected: 11/16/22 0617    Order Status: Sent Specimen: Blood from Peripheral, Hand, Right Updated: 11/16/22 1042    Influenza A & B by Molecular [[id number]] Collected: 11/16/22 0622    Order Status: Completed Specimen: Nasopharyngeal Swab Updated: 11/16/22 0727     Influenza A, Molecular Negative     Influenza B, Molecular Negative     Flu A & B Source Nasal swab    Culture, Respiratory with Gram Stain [[id number]] Collected: 11/16/22 0712    Order Status: Sent Specimen: Respiratory from Sputum Updated: 11/16/22 0717       Will monitor patient closely and continue current treatment plan unchanged.

## 2022-11-16 NOTE — ASSESSMENT & PLAN NOTE
Patient with known CAD s/p stent placement, which is controlled Will continue ASA, Plavix and Statin and monitor for S/Sx of angina/ACS. Continue to monitor on telemetry.

## 2022-11-16 NOTE — ASSESSMENT & PLAN NOTE
Continue iron supplementation  Recent Labs from PCP:     Ref Range & Units 2 wk ago   TIBC 250 - 400 ug/dL 474 High     % Saturation 20 - 50 % 6 Low     Iron 40 - 150 ug/dL 29 L Low

## 2022-11-16 NOTE — ASSESSMENT & PLAN NOTE
Patient has chronic hypothyroidism. Will continue chronic levothyroxine and adjust for and clinical changes.   Contains abnormal data TSH  Order: [id number]   Ref Range & Units 2 wk ago   TSH 0.27 - 4.20 uIU/mL 11.71 High     Resulting Agency  [facility]

## 2022-11-16 NOTE — ASSESSMENT & PLAN NOTE
.Patient's FSGs are controlled on current medication regimen.  Last A1c reviewed-  Hemoglobin A1c  Specimen:  Blood - Venous structure (body structure)   Ref Range & Units 2 wk ago   Hgb A1c 4.2 - 5.8 % 6.7 High     EAG (mg/dl)  137.11    Resulting Agency  [facility]       Most recent fingerstick glucose reviewed-   Recent Labs   Lab 11/16/22  0624   POCTGLUCOSE 128*     Current correctional scale  Low  Maintain anti-hyperglycemic dose as follows-   Antihyperglycemics (From admission, onward)    Start     Stop Route Frequency Ordered    11/16/22 1154  insulin aspart U-100 pen 1-10 Units         -- SubQ Before meals & nightly PRN 11/16/22 1059        Hold Oral hypoglycemics while patient is in the hospital.

## 2022-11-16 NOTE — ED PROVIDER NOTES
SCRIBE #1 NOTE: I, Malka Kruse, am scribing for, and in the presence of, Lashonda Tracy MD. I have scribed the entire note.      History      Chief Complaint   Patient presents with    Shortness of Breath     Per [facility], patient c/o SOB x 2 days with fever, reports patient was tested for covid and flu (both negative) 2 days ago         Review of patient's allergies indicates:   Allergen Reactions    Latex, natural rubber      Other reaction(s): swelling    Sulfa (sulfonamide antibiotics)         HPI   HPI    11/16/2022, 6:16 AM   History obtained from the patient, the pt's family member at bedside, and [facility].      History of Present Illness: Chiquita Ridley is a 66 y.o. female patient with a PMHx of DM2, HLD, HTN, COPD, and CHF who presents to the Emergency Department for SOB which onset gradually last week. The pt is a former smoker who stopped smoking 10 years ago. For the past week, the pt has not been feeling well. Due to her sxs, the pt saw her PCP, Dr. Myers, last week and tested negative for the flu and COVID-19. After she initially saw her PCP for this complaint, her sxs worsened, so she called her PCP and was prescribed 40 mg of prednisone. In addition to taking prednisone, the pt has been using albuterol every 4 hours and last used albuterol last night. This morning, [facility] was [facility]. Per the pt's family member, when [facility] arrived on scene this morning and took the pt's vitals, the pt was febrile with a temperature of 102.7 and had an O2 sat of 69%. For the fever, the pt's family member gave the pt Tylenol. En route to the ER, the pt was placed on a non-rebreather by [facility]. Symptoms are constant and moderate in severity. No mitigating or exacerbating factors reported. Associated sxs include general malaise, a productive cough, an intermittent fever (T max 102.7), N/V/D, and decreased PO intake. Patient denies any CP, abdominal pain, dysuria, headaches, diaphoresis, dizziness, and  all other sxs at this time. Pt reports that she takes a diuretic. The pt also reports that she has not received her flu vaccine this year. No further complaints or concerns at this time.         Arrival mode: [facility]    PCP: Virgil Persaud MD       Past Medical History:  Past Medical History:   Diagnosis Date    Allergy     Arthritis     Diabetes mellitus, type 2     Hyperlipidemia     Hypertension        Past Surgical History:  Past Surgical History:   Procedure Laterality Date    CAROTID STENT      HYSTERECTOMY           Family History:  Family History   Problem Relation Age of Onset    Heart disease Mother     Parkinsonism Father     Stroke Father     Asthma Daughter        Social History:  Social History     Tobacco Use    Smoking status: Every Day    Smokeless tobacco: Never   Substance and Sexual Activity    Alcohol use: No    Drug use: No    Sexual activity: Yes       ROS   Review of Systems   Constitutional:  Positive for appetite change (decreased PO intake) and fever (intermittent and T max 102.7). Negative for diaphoresis.        (+) general malaise   HENT:  Negative for sore throat.    Respiratory:  Positive for cough (productive) and shortness of breath.    Cardiovascular:  Negative for chest pain.   Gastrointestinal:  Positive for diarrhea, nausea and vomiting. Negative for abdominal pain.   Genitourinary:  Negative for dysuria.   Musculoskeletal:  Negative for back pain.   Skin:  Negative for rash.   Neurological:  Negative for dizziness, weakness and headaches.   Hematological:  Does not bruise/bleed easily.   All other systems reviewed and are negative.    Physical Exam      Initial Vitals [11/16/22 0613]   BP Pulse Resp Temp SpO2   (!) 103/50 67 (!) 30 (!) 100.4 °F (38 °C) 96 %      MAP       --          Physical Exam  Nursing Notes and Vital Signs Reviewed.  Constitutional: Patient is in no obvious distress. Chronically ill appearing.  Head: Atraumatic. Normocephalic.  Eyes: PERRL. EOM intact.  Conjunctivae are not pale. No scleral icterus.  ENT: Mucous membranes are dry. Oropharynx is clear and symmetric.    Neck: Supple. Full ROM. No lymphadenopathy.  Cardiovascular: Regular rate. Regular rhythm. No murmurs, rubs, or gallops. Distal pulses are 2+ and symmetric.  Pulmonary/Chest: Mild tachypnea noted.  Crackles bilaterally.  Abdominal: Soft and non-distended.  There is no tenderness.  No rebound, guarding, or rigidity.   Musculoskeletal: Moves all extremities. No obvious deformities. No edema.  Skin: Warm and dry.  Neurological:  Alert, awake, and appropriate.  Normal speech.  No acute focal neurological deficits are appreciated.  Psychiatric: Normal affect. Good eye contact. Appropriate in content.    ED Course    Critical Care    Date/Time: 11/16/2022 9:36 AM  Performed by: Lashonda Tracy MD  Authorized by: Lashonda Tracy MD   Direct patient critical care time: 25 minutes  Additional history critical care time: 9 minutes  Ordering / reviewing critical care time: 7 minutes  Documentation critical care time: 8 minutes  Consulting other physicians critical care time: 11 minutes  Total critical care time (exclusive of procedural time) : 60 minutes  Critical care time was exclusive of separately billable procedures and treating other patients and teaching time.  Critical care was necessary to treat or prevent imminent or life-threatening deterioration of the following conditions: sepsis and respiratory failure.  Critical care was time spent personally by me on the following activities: blood draw for specimens, development of treatment plan with patient or surrogate, discussions with consultants, interpretation of cardiac output measurements, evaluation of patient's response to treatment, examination of patient, obtaining history from patient or surrogate, ordering and performing treatments and interventions, ordering and review of laboratory studies, ordering and review of radiographic studies, pulse  oximetry, re-evaluation of patient's condition and review of old charts.      ED Vital Signs:  Vitals:    11/16/22 0710 11/16/22 0722 11/16/22 0745 11/16/22 0800   BP: (!) 105/53 (!) 108/56 (!) 120/56 (!) 114/56   Pulse: 60 (!) 58 (!) 57 (!) 55   Resp: (!) 27 (!) 24 (!) 25 (!) 30   Temp:       TempSrc:       SpO2: (!) 94% 96% 96% (!) 91%   Weight:       Height:        11/16/22 0818 11/16/22 0830 11/16/22 0900 11/16/22 0915   BP: (!) 96/54 (!) 99/50 (!) 115/56 (!) 112/56   Pulse: (!) 57 (!) 53 (!) 57 (!) 56   Resp: (!) 23 (!) 24 (!) 25 (!) 27   Temp:  98.6 °F (37 °C)     TempSrc:  Oral     SpO2: (!) 92% (!) 92% (!) 92% 95%   Weight:       Height:        11/16/22 0930 11/16/22 0945 11/16/22 1030 11/16/22 1113   BP: (!) 112/56 (!) 107/52 (!) 119/58    Pulse: (!) 55 (!) 58 (!) 58 63   Resp: (!) 30 (!) 24 17 (!) 22   Temp:       TempSrc:       SpO2: 95% (!) 94% 96% (!) 92%   Weight:       Height:        11/16/22 1115 11/16/22 1215 11/16/22 1315   BP:  135/63 (!) 113/58   Pulse: 62 68 66   Resp: 20 (!) 27 (!) 27   Temp:      TempSrc:      SpO2: (!) 92% (!) 94% (!) 94%   Weight:      Height:          Abnormal Lab Results:  Labs Reviewed   CBC W/ AUTO DIFFERENTIAL - Abnormal; Notable for the following components:       Result Value    Hemoglobin 9.9 (*)     Hematocrit 31.7 (*)     MCV 76 (*)     MCH 23.8 (*)     MCHC 31.2 (*)     RDW 17.4 (*)     Immature Granulocytes 0.6 (*)     Immature Grans (Abs) 0.05 (*)     Lymph # 0.8 (*)     Gran % 79.0 (*)     Lymph % 9.4 (*)     All other components within normal limits   COMPREHENSIVE METABOLIC PANEL - Abnormal; Notable for the following components:    Sodium 132 (*)     CO2 22 (*)     Glucose 123 (*)     BUN 38 (*)     Creatinine 1.5 (*)     Albumin 2.8 (*)     Alkaline Phosphatase 52 (*)     AST 71 (*)     eGFR 38 (*)     All other components within normal limits   URINALYSIS, REFLEX TO URINE CULTURE - Abnormal; Notable for the following components:    Protein, UA 1+ (*)      Occult Blood UA 1+ (*)     All other components within normal limits    Narrative:     Specimen Source->Urine   PROCALCITONIN - Abnormal; Notable for the following components:    Procalcitonin 2.10 (*)     All other components within normal limits   URINALYSIS MICROSCOPIC - Abnormal; Notable for the following components:    WBC Clumps, UA Occasional (*)     Hyaline Casts, UA 3 (*)     All other components within normal limits    Narrative:     Specimen Source->Urine   ISTAT PROCEDURE - Abnormal; Notable for the following components:    POC PH 7.482 (*)     POC PCO2 34.0 (*)     POC PO2 63 (*)     POC SATURATED O2 94 (*)     All other components within normal limits   POCT GLUCOSE - Abnormal; Notable for the following components:    POCT Glucose 128 (*)     All other components within normal limits   POCT GLUCOSE - Abnormal; Notable for the following components:    POCT Glucose 130 (*)     All other components within normal limits   INFLUENZA A & B BY MOLECULAR   CULTURE, BLOOD   CULTURE, BLOOD   CULTURE, RESPIRATORY   LACTIC ACID, PLASMA   TROPONIN I   B-TYPE NATRIURETIC PEPTIDE   PROTIME-INR   APTT   MAGNESIUM   SARS-COV-2 RNA AMPLIFICATION, QUAL   LACTIC ACID, PLASMA   POCT GLUCOSE, HAND-HELD DEVICE   POCT GLUCOSE MONITORING CONTINUOUS        All Lab Results:  Results for orders placed or performed during the hospital encounter of 11/16/22   Influenza A & B by Molecular    Specimen: Nasopharyngeal Swab   Result Value Ref Range    Influenza A, Molecular Negative Negative    Influenza B, Molecular Negative Negative    Flu A & B Source Nasal swab    CBC auto differential   Result Value Ref Range    WBC 8.51 3.90 - 12.70 K/uL    RBC 4.16 4.00 - 5.40 M/uL    Hemoglobin 9.9 (L) 12.0 - 16.0 g/dL    Hematocrit 31.7 (L) 37.0 - 48.5 %    MCV 76 (L) 82 - 98 fL    MCH 23.8 (L) 27.0 - 31.0 pg    MCHC 31.2 (L) 32.0 - 36.0 g/dL    RDW 17.4 (H) 11.5 - 14.5 %    Platelets 215 150 - 450 K/uL    MPV 10.7 9.2 - 12.9 fL    Immature  Granulocytes 0.6 (H) 0.0 - 0.5 %    Gran # (ANC) 6.7 1.8 - 7.7 K/uL    Immature Grans (Abs) 0.05 (H) 0.00 - 0.04 K/uL    Lymph # 0.8 (L) 1.0 - 4.8 K/uL    Mono # 0.9 0.3 - 1.0 K/uL    Eos # 0.0 0.0 - 0.5 K/uL    Baso # 0.01 0.00 - 0.20 K/uL    nRBC 0 0 /100 WBC    Gran % 79.0 (H) 38.0 - 73.0 %    Lymph % 9.4 (L) 18.0 - 48.0 %    Mono % 10.9 4.0 - 15.0 %    Eosinophil % 0.0 0.0 - 8.0 %    Basophil % 0.1 0.0 - 1.9 %    Differential Method Automated    Comprehensive metabolic panel   Result Value Ref Range    Sodium 132 (L) 136 - 145 mmol/L    Potassium 3.6 3.5 - 5.1 mmol/L    Chloride 98 95 - 110 mmol/L    CO2 22 (L) 23 - 29 mmol/L    Glucose 123 (H) 70 - 110 mg/dL    BUN 38 (H) 8 - 23 mg/dL    Creatinine 1.5 (H) 0.5 - 1.4 mg/dL    Calcium 8.7 8.7 - 10.5 mg/dL    Total Protein 7.5 6.0 - 8.4 g/dL    Albumin 2.8 (L) 3.5 - 5.2 g/dL    Total Bilirubin 0.5 0.1 - 1.0 mg/dL    Alkaline Phosphatase 52 (L) 55 - 135 U/L    AST 71 (H) 10 - 40 U/L    ALT 41 10 - 44 U/L    Anion Gap 12 8 - 16 mmol/L    eGFR 38 (A) >60 mL/min/1.73 m^2   Lactic acid, plasma #1   Result Value Ref Range    Lactate (Lactic Acid) 1.1 0.5 - 2.2 mmol/L   Urinalysis, Reflex to Urine Culture Urine, Clean Catch    Specimen: Urine   Result Value Ref Range    Specimen UA Urine, Clean Catch     Color, UA Yellow Yellow, Straw, Jolene    Appearance, UA Clear Clear    pH, UA 6.0 5.0 - 8.0    Specific Gravity, UA 1.015 1.005 - 1.030    Protein, UA 1+ (A) Negative    Glucose, UA Negative Negative    Ketones, UA Negative Negative    Bilirubin (UA) Negative Negative    Occult Blood UA 1+ (A) Negative    Nitrite, UA Negative Negative    Urobilinogen, UA Negative <2.0 EU/dL    Leukocytes, UA Negative Negative   Procalcitonin   Result Value Ref Range    Procalcitonin 2.10 (H) <0.25 ng/mL   Troponin I   Result Value Ref Range    Troponin I 0.023 0.000 - 0.026 ng/mL   Brain natriuretic peptide   Result Value Ref Range    BNP 32 0 - 99 pg/mL   Protime-INR   Result Value Ref  Range    Prothrombin Time 10.3 9.0 - 12.5 sec    INR 1.0 0.8 - 1.2   APTT   Result Value Ref Range    aPTT 29.1 21.0 - 32.0 sec   Magnesium   Result Value Ref Range    Magnesium 2.3 1.6 - 2.6 mg/dL   COVID-19 Rapid Screening   Result Value Ref Range    SARS-CoV-2 RNA, Amplification, Qual Negative Negative   Urinalysis Microscopic   Result Value Ref Range    RBC, UA 2 0 - 4 /hpf    WBC, UA 3 0 - 5 /hpf    WBC Clumps, UA Occasional (A) None-Rare    Bacteria None None-Occ /hpf    Hyaline Casts, UA 3 (A) 0-1/lpf /lpf    Unclass Ruba UA Rare None-Moderate    Microscopic Comment SEE COMMENT    Lactic acid, plasma #2   Result Value Ref Range    Lactate (Lactic Acid) 1.0 0.5 - 2.2 mmol/L   ISTAT PROCEDURE   Result Value Ref Range    POC PH 7.482 (H) 7.35 - 7.45    POC PCO2 34.0 (L) 35 - 45 mmHg    POC PO2 63 (L) 80 - 100 mmHg    POC HCO3 25.4 24 - 28 mmol/L    POC BE 2 -2 to 2 mmol/L    POC SATURATED O2 94 (L) 95 - 100 %    Sample ARTERIAL     Site LR     Allens Test Pass     DelSys Nasal Can     Mode SPONT     Flow 4     FiO2 36    POCT glucose   Result Value Ref Range    POCT Glucose 128 (H) 70 - 110 mg/dL   POCT glucose   Result Value Ref Range    POCT Glucose 130 (H) 70 - 110 mg/dL         Imaging Results:  Imaging Results              X-Ray Chest AP Portable (Final result)  Result time 11/16/22 07:15:23      Final result by Dean Blair MD (11/16/22 07:15:23)                   Impression:      1.  Patchy mixed interstitial and alveolar opacities throughout the mid lower lungs bilaterally.  Bilateral pneumonia, such as community-acquired pneumonia or atypical viral pneumonia must be considered.    2.  Incidental findings as noted above.      Electronically signed by: Dean Blair MD  Date:    11/16/2022  Time:    07:15               Narrative:    EXAMINATION:  XR CHEST AP PORTABLE    CLINICAL HISTORY:  Sepsis;    COMPARISON:  No comparison studies are available.    FINDINGS:  EKG leads and oxygen tubing overlie the  chest which is lordotic in position and rotated to the right.  There are patchy opacities throughout the mid lower lungs bilaterally.  The upper lungs are clear. The cardiac silhouette size is borderline enlarged.  The trachea is midline and the mediastinal width is normal. Negative for effusion or pneumothorax.  Pulmonary vasculature is normal. Negative for osseous abnormalities. Ectatic and tortuous aorta with aortic arch calcifications.  Degenerative changes of the spine and both shoulder girdles.                                     The EKG was ordered, reviewed, and independently interpreted by the ED provider.  Interpretation time: 6:25  Rate: 66 BPM  Rhythm: normal sinus rhythm  Interpretation: Left axis deviation. Left ventricular hypertrophy with QRS widening and repolarization abnormality. No STEMI.           The Emergency Provider reviewed the vital signs and test results, which are outlined above.    ED Discussion     8:30 AM: Discussed case with Dennis Bennett NP ([facility]). Dr. Coburn agrees with current care and management of pt and accepts admission.   Admitting Service: [facility]   Admitting Physician: Dr. Coburn  Admit to: Inpatient     8:31 AM: Re-evaluated pt. I have discussed test results, shared treatment plan, and the need for admission with patient and family at bedside. Pt and family express understanding at this time and agree with all information. All questions answered. Pt and family have no further questions or concerns at this time. Pt is ready for admit.           ED Medication(s):  Medications   cefTRIAXone (ROCEPHIN) 1 g/50 mL D5W IVPB (0 g Intravenous Stopped 11/16/22 0707)   azithromycin 500 mg in dextrose 5 % 250 mL IVPB (ready to mix system) (0 mg Intravenous Stopped 11/16/22 0745)   albuterol nebulizer solution 2.5 mg (2.5 mg Nebulization Given 11/16/22 1113)   methylPREDNISolone sodium succinate injection 80 mg (80 mg Intravenous Given 11/16/22 1030)    budesonide nebulizer solution 0.5 mg (0.5 mg Nebulization Given 11/16/22 1113)   arformoteroL nebulizer solution 15 mcg (15 mcg Nebulization Given 11/16/22 1115)   sodium chloride 0.9% flush 10 mL (has no administration in time range)   naloxone 0.4 mg/mL injection 0.02 mg (has no administration in time range)   glucose chewable tablet 16 g (has no administration in time range)   glucose chewable tablet 24 g (has no administration in time range)   glucagon (human recombinant) injection 1 mg (has no administration in time range)   dextrose 10% bolus 125 mL (has no administration in time range)   dextrose 10% bolus 250 mL (has no administration in time range)   enoxaparin injection 40 mg (has no administration in time range)   HYDROcodone-acetaminophen 5-325 mg per tablet 1 tablet (has no administration in time range)   HYDROcodone-acetaminophen  mg per tablet 1 tablet (has no administration in time range)   ondansetron injection 4 mg (has no administration in time range)   insulin aspart U-100 pen 1-10 Units (has no administration in time range)   acetaminophen tablet 650 mg (has no administration in time range)   melatonin tablet 6 mg (has no administration in time range)   aspirin chewable tablet 81 mg (81 mg Oral Given 11/16/22 1251)   atorvastatin tablet 40 mg (has no administration in time range)   clopidogreL tablet 75 mg (75 mg Oral Given 11/16/22 1251)   ferrous sulfate tablet 1 each (1 each Oral Given 11/16/22 1251)   levothyroxine tablet 112 mcg (has no administration in time range)   gabapentin capsule 100 mg (has no administration in time range)   sodium chloride 0.9% bolus 500 mL (0 mLs Intravenous Stopped 11/16/22 0712)   ondansetron injection 4 mg (4 mg Intravenous Given 11/16/22 0650)   sodium chloride 0.9% bolus 500 mL (0 mLs Intravenous Stopped 11/16/22 0937)           New Prescriptions    No medications on file         Medical Decision Making    Medical Decision Making:   Clinical Tests:    Lab Tests: Ordered and Reviewed  Radiological Study: Ordered and Reviewed  Medical Tests: Ordered and Reviewed         Scribe Attestation:   Scribe #1: I performed the above scribed service and the documentation accurately describes the services I performed. I attest to the accuracy of the note.    Attending:   Physician Attestation Statement for Scribe #1: I, Lashonda Tracy MD, personally performed the services described in this documentation, as scribed by Malka Kruse, in my presence, and it is both accurate and complete.          Clinical Impression       ICD-10-CM ICD-9-CM   1. Pneumonia of both lower lobes due to infectious organism  J18.9 486   2. Shortness of breath  R06.02 786.05   3. Acute hypoxemic respiratory failure  J96.01 518.81   4. Sepsis, due to unspecified organism, unspecified whether acute organ dysfunction present  A41.9 038.9     995.91       Disposition:   Disposition: Admitted  Condition: Fair       Lashonda Tracy MD  11/16/22 1327

## 2022-11-16 NOTE — HPI
Chiquita Ridley is a 66 year old female with PMHx of of chf, cad, hypothyroid, chronic anemia, copd, former smoker who presented to the Emergency Department this morning with shortness of breath, cough, and fever. Symptoms have been present for 1 week. Was seen at her PCP with cough and shortness of breath on 11/11, she tested negative for flu and COVID and was diagnosed with an upper respiratory infection and mild COPD exacerbation, was given trelegy sample and phenergan DM. Contacted PCP 2 days ago when she developed fever and was placed on prednisone taper, however symptoms worsened and ultimately led her to contacting 911. Family report her SpO2 saturations were 69-70% this morning and her temperature was 102.7. She presented via ambulance on a NRB. Symptoms have been constant and progressively worsening. Prior treatment includes albuterol, Trelegy, prednisone, phenergan DM, tylenol.      In Emergency Department noted to have hypoxic respiratory failure and bilateral pneumonia on CXR . Labs revealed normal lactic acid, urine uninfected, mild hyponatremia with Na 132, PATRICIA with BUN 38 and Creat 1.5, procalcitonin 2.10, slight anemia with H&H 9.9/ 31.7, troponin negative, blood cultures collected, BNP pending. Patient admitted to hospital medicine services for further treatment and evaluation.

## 2022-11-16 NOTE — ASSESSMENT & PLAN NOTE
Chronic, controlled-hypotensive currently.  Latest blood pressure and vitals reviewed-   Temp:  [98.6 °F (37 °C)-100.4 °F (38 °C)]   Pulse:  [53-74]   Resp:  [17-30]   BP: ()/(50-67)   SpO2:  [91 %-97 %] .   Home meds for hypertension were reviewed and noted below.   Hypertension Medications             amLODIPine (NORVASC) 5 MG tablet Take 5 mg by mouth once daily.    enalapril (VASOTEC) 10 MG tablet Take 10 mg by mouth once daily.    furosemide (LASIX) 40 MG tablet Take 40 mg by mouth once daily.    losartan (COZAAR) 100 MG tablet Take 100 mg by mouth once daily.    metoprolol succinate (TOPROL-XL) 25 MG 24 hr tablet Take 25 mg by mouth once daily.    metoprolol tartrate (LOPRESSOR) 50 MG tablet Take 50 mg by mouth 2 (two) times a day.    SPIRONOLACTONE ORAL Take by mouth.          While in the hospital, will manage blood pressure as follows; Adjust home antihypertensive regimen as follows- hold due to hypotension- restart when able    Will utilize p.r.n. blood pressure medication only if patient's systolic blood pressure greater than 180

## 2022-11-16 NOTE — ASSESSMENT & PLAN NOTE
With bilateral pneumonia  Pulse ox continuous  Budesonide (ICS) and formoterol (LABA) q 12 hours  Duoneb PRN q 6  PRN O2 to maintain SPO2 >94%  Solumedrol IV

## 2022-11-16 NOTE — SUBJECTIVE & OBJECTIVE
Past Medical History:   Diagnosis Date    Allergy     Arthritis     Diabetes mellitus, type 2     Hyperlipidemia     Hypertension        Past Surgical History:   Procedure Laterality Date    CAROTID STENT      HYSTERECTOMY         Review of patient's allergies indicates:   Allergen Reactions    Latex, natural rubber      Other reaction(s): swelling    Sulfa (sulfonamide antibiotics)        No current facility-administered medications on file prior to encounter.     Current Outpatient Medications on File Prior to Encounter   Medication Sig    bimatoprost (LUMIGAN) 0.01 % Drop Apply 1 drop to eye every evening.    fluticasone-salmeterol diskus inhaler 100-50 mcg Inhale 1 puff into the lungs 2 (two) times a day.    albuterol 90 mcg/actuation inhaler Inhale 2 puffs into the lungs every 4 to 6 hours as needed for Wheezing.    amLODIPine (NORVASC) 5 MG tablet Take 5 mg by mouth once daily.    aspirin 81 MG Chew Take 81 mg by mouth once daily.    atorvastatin (LIPITOR) 40 MG tablet Take 40 mg by mouth every evening.    butalbital-acetaminophen-caffeine -40 mg (FIORICET, ESGIC) -40 mg per tablet Take 1 tablet by mouth every 6 (six) hours as needed for Headaches.    clopidogreL (PLAVIX) 75 mg tablet Take 75 mg by mouth once daily.    enalapril (VASOTEC) 10 MG tablet Take 10 mg by mouth once daily.    EScitalopram oxalate (LEXAPRO) 10 MG tablet Take 10 mg by mouth once daily.    FERROUS SULFATE, DRIED (IRON, DRIED, ORAL) Take by mouth.    furosemide (LASIX) 40 MG tablet Take 40 mg by mouth once daily.    gabapentin (NEURONTIN) 300 MG capsule Take 300 mg by mouth 3 (three) times daily.    HYDROcodone-acetaminophen (NORCO)  mg per tablet Take 1 tablet by mouth every 8 (eight) hours as needed.    indomethacin (INDOCIN) 25 MG capsule Take 25 mg by mouth 2 (two) times daily.    lamoTRIgine (LAMICTAL) 100 MG tablet Take 100 mg by mouth 3 (three) times daily.    levothyroxine (SYNTHROID) 112 MCG tablet Take 112 mcg  by mouth once daily.    losartan (COZAAR) 100 MG tablet Take 100 mg by mouth once daily.    meloxicam (MOBIC) 7.5 MG tablet Take 7.5 mg by mouth once daily.    metFORMIN (GLUCOPHAGE) 500 MG tablet Take 500 mg by mouth once daily.    metoprolol succinate (TOPROL-XL) 25 MG 24 hr tablet Take 25 mg by mouth once daily.    metoprolol tartrate (LOPRESSOR) 50 MG tablet Take 50 mg by mouth 2 (two) times a day.    rOPINIRole (REQUIP) 1 MG tablet Take 1 mg by mouth 3 (three) times daily.    SPIRONOLACTONE ORAL Take by mouth.    zolpidem (AMBIEN) 10 mg Tab Take 5 mg by mouth nightly as needed.    [DISCONTINUED] predniSONE (DELTASONE) 20 MG tablet Take 2 tab daily x 3 days, 1 tab daily x 3 days, then 1/2 tab daily x 2 days.     Family History       Problem Relation (Age of Onset)    Asthma Daughter    Heart disease Mother    Parkinsonism Father    Stroke Father          Tobacco Use    Smoking status: Every Day    Smokeless tobacco: Never   Substance and Sexual Activity    Alcohol use: No    Drug use: No    Sexual activity: Yes     Review of Systems   Constitutional:  Positive for activity change, appetite change, chills, fatigue and fever.   HENT:  Positive for congestion. Negative for rhinorrhea, sneezing, sore throat and trouble swallowing.    Respiratory:  Positive for cough, chest tightness, shortness of breath and wheezing.    Cardiovascular:  Positive for leg swelling (chronic). Negative for chest pain and palpitations.   Gastrointestinal:  Negative for abdominal pain, constipation, diarrhea, nausea and vomiting.   Genitourinary: Negative.    Musculoskeletal:  Positive for myalgias. Negative for gait problem and joint swelling.   Neurological:  Positive for weakness (generalized) and headaches. Negative for dizziness, tremors and speech difficulty.   Psychiatric/Behavioral:  Negative for agitation, behavioral problems and confusion. The patient is not nervous/anxious.    Objective:     Vital Signs (Most Recent):  Temp:  98.6 °F (37 °C) (11/16/22 0830)  Pulse: (!) 58 (11/16/22 1030)  Resp: 17 (11/16/22 1030)  BP: (!) 119/58 (11/16/22 1030)  SpO2: 96 % (11/16/22 1030)   Vital Signs (24h Range):  Temp:  [98.6 °F (37 °C)-100.4 °F (38 °C)] 98.6 °F (37 °C)  Pulse:  [53-74] 58  Resp:  [17-30] 17  SpO2:  [91 %-97 %] 96 %  BP: ()/(50-67) 119/58     Weight: 109.1 kg (240 lb 9.6 oz)  Body mass index is 37.68 kg/m².    Physical Exam  Vitals and nursing note reviewed.   Constitutional:       General: She is awake. She is in acute distress.      Appearance: She is morbidly obese. She is ill-appearing.      Comments: On BIPAP   HENT:      Head: Normocephalic and atraumatic.   Eyes:      Extraocular Movements: Extraocular movements intact.      Comments: +bilateral Exophthalmos   Cardiovascular:      Rate and Rhythm: Tachycardia present. Rhythm irregular.      Heart sounds: Normal heart sounds.      Comments: +PVCs noted on bedside cardiac monitor  Pulmonary:      Effort: Tachypnea and accessory muscle usage present.      Breath sounds: Transmitted upper airway sounds present. Wheezing and rhonchi present.   Abdominal:      General: Abdomen is protuberant.      Tenderness: There is no abdominal tenderness.   Genitourinary:     Comments: deferred  Musculoskeletal:         General: Normal range of motion.      Cervical back: Normal range of motion.      Right lower leg: Edema present.   Skin:     General: Skin is warm and dry.      Capillary Refill: Capillary refill takes 2 to 3 seconds.   Neurological:      Mental Status: She is alert and oriented to person, place, and time.   Psychiatric:         Attention and Perception: Attention normal.         Mood and Affect: Mood normal.         Behavior: Behavior is cooperative.           Significant Labs: All pertinent labs within the past 24 hours have been reviewed.  Results for orders placed or performed during the hospital encounter of 11/16/22   Influenza A & B by Molecular    Specimen:  Nasopharyngeal Swab   Result Value Ref Range    Influenza A, Molecular Negative Negative    Influenza B, Molecular Negative Negative    Flu A & B Source Nasal swab    CBC auto differential   Result Value Ref Range    WBC 8.51 3.90 - 12.70 K/uL    RBC 4.16 4.00 - 5.40 M/uL    Hemoglobin 9.9 (L) 12.0 - 16.0 g/dL    Hematocrit 31.7 (L) 37.0 - 48.5 %    MCV 76 (L) 82 - 98 fL    MCH 23.8 (L) 27.0 - 31.0 pg    MCHC 31.2 (L) 32.0 - 36.0 g/dL    RDW 17.4 (H) 11.5 - 14.5 %    Platelets 215 150 - 450 K/uL    MPV 10.7 9.2 - 12.9 fL    Immature Granulocytes 0.6 (H) 0.0 - 0.5 %    Gran # (ANC) 6.7 1.8 - 7.7 K/uL    Immature Grans (Abs) 0.05 (H) 0.00 - 0.04 K/uL    Lymph # 0.8 (L) 1.0 - 4.8 K/uL    Mono # 0.9 0.3 - 1.0 K/uL    Eos # 0.0 0.0 - 0.5 K/uL    Baso # 0.01 0.00 - 0.20 K/uL    nRBC 0 0 /100 WBC    Gran % 79.0 (H) 38.0 - 73.0 %    Lymph % 9.4 (L) 18.0 - 48.0 %    Mono % 10.9 4.0 - 15.0 %    Eosinophil % 0.0 0.0 - 8.0 %    Basophil % 0.1 0.0 - 1.9 %    Differential Method Automated    Comprehensive metabolic panel   Result Value Ref Range    Sodium 132 (L) 136 - 145 mmol/L    Potassium 3.6 3.5 - 5.1 mmol/L    Chloride 98 95 - 110 mmol/L    CO2 22 (L) 23 - 29 mmol/L    Glucose 123 (H) 70 - 110 mg/dL    BUN 38 (H) 8 - 23 mg/dL    Creatinine 1.5 (H) 0.5 - 1.4 mg/dL    Calcium 8.7 8.7 - 10.5 mg/dL    Total Protein 7.5 6.0 - 8.4 g/dL    Albumin 2.8 (L) 3.5 - 5.2 g/dL    Total Bilirubin 0.5 0.1 - 1.0 mg/dL    Alkaline Phosphatase 52 (L) 55 - 135 U/L    AST 71 (H) 10 - 40 U/L    ALT 41 10 - 44 U/L    Anion Gap 12 8 - 16 mmol/L    eGFR 38 (A) >60 mL/min/1.73 m^2   Lactic acid, plasma #1   Result Value Ref Range    Lactate (Lactic Acid) 1.1 0.5 - 2.2 mmol/L   Urinalysis, Reflex to Urine Culture Urine, Clean Catch    Specimen: Urine   Result Value Ref Range    Specimen UA Urine, Clean Catch     Color, UA Yellow Yellow, Straw, Jolene    Appearance, UA Clear Clear    pH, UA 6.0 5.0 - 8.0    Specific Gravity, UA 1.015 1.005 - 1.030     Protein, UA 1+ (A) Negative    Glucose, UA Negative Negative    Ketones, UA Negative Negative    Bilirubin (UA) Negative Negative    Occult Blood UA 1+ (A) Negative    Nitrite, UA Negative Negative    Urobilinogen, UA Negative <2.0 EU/dL    Leukocytes, UA Negative Negative   Procalcitonin   Result Value Ref Range    Procalcitonin 2.10 (H) <0.25 ng/mL   Troponin I   Result Value Ref Range    Troponin I 0.023 0.000 - 0.026 ng/mL   Brain natriuretic peptide   Result Value Ref Range    BNP 32 0 - 99 pg/mL   Protime-INR   Result Value Ref Range    Prothrombin Time 10.3 9.0 - 12.5 sec    INR 1.0 0.8 - 1.2   APTT   Result Value Ref Range    aPTT 29.1 21.0 - 32.0 sec   Magnesium   Result Value Ref Range    Magnesium 2.3 1.6 - 2.6 mg/dL   COVID-19 Rapid Screening   Result Value Ref Range    SARS-CoV-2 RNA, Amplification, Qual Negative Negative   Urinalysis Microscopic   Result Value Ref Range    RBC, UA 2 0 - 4 /hpf    WBC, UA 3 0 - 5 /hpf    WBC Clumps, UA Occasional (A) None-Rare    Bacteria None None-Occ /hpf    Hyaline Casts, UA 3 (A) 0-1/lpf /lpf    Unclass Ruba UA Rare None-Moderate    Microscopic Comment SEE COMMENT    ISTAT PROCEDURE   Result Value Ref Range    POC PH 7.482 (H) 7.35 - 7.45    POC PCO2 34.0 (L) 35 - 45 mmHg    POC PO2 63 (L) 80 - 100 mmHg    POC HCO3 25.4 24 - 28 mmol/L    POC BE 2 -2 to 2 mmol/L    POC SATURATED O2 94 (L) 95 - 100 %    Sample ARTERIAL     Site LR     Allens Test Pass     DelSys Nasal Can     Mode SPONT     Flow 4     FiO2 36    POCT glucose   Result Value Ref Range    POCT Glucose 128 (H) 70 - 110 mg/dL       Significant Imaging: I have reviewed all pertinent imaging results/findings within the past 24 hours.  Imaging Results              X-Ray Chest AP Portable (Final result)  Result time 11/16/22 07:15:23      Final result by Dean Blair MD (11/16/22 07:15:23)                   Impression:      1.  Patchy mixed interstitial and alveolar opacities throughout the mid lower lungs  bilaterally.  Bilateral pneumonia, such as community-acquired pneumonia or atypical viral pneumonia must be considered.    2.  Incidental findings as noted above.      Electronically signed by: Dean Blair MD  Date:    11/16/2022  Time:    07:15               Narrative:    EXAMINATION:  XR CHEST AP PORTABLE    CLINICAL HISTORY:  Sepsis;    COMPARISON:  No comparison studies are available.    FINDINGS:  EKG leads and oxygen tubing overlie the chest which is lordotic in position and rotated to the right.  There are patchy opacities throughout the mid lower lungs bilaterally.  The upper lungs are clear. The cardiac silhouette size is borderline enlarged.  The trachea is midline and the mediastinal width is normal. Negative for effusion or pneumothorax.  Pulmonary vasculature is normal. Negative for osseous abnormalities. Ectatic and tortuous aorta with aortic arch calcifications.  Degenerative changes of the spine and both shoulder girdles.

## 2022-11-17 LAB
ALBUMIN SERPL BCP-MCNC: 2.8 G/DL (ref 3.5–5.2)
ALP SERPL-CCNC: 68 U/L (ref 55–135)
ALT SERPL W/O P-5'-P-CCNC: 55 U/L (ref 10–44)
ANION GAP SERPL CALC-SCNC: 12 MMOL/L (ref 8–16)
AST SERPL-CCNC: 80 U/L (ref 10–40)
BILIRUB SERPL-MCNC: 0.4 MG/DL (ref 0.1–1)
BUN SERPL-MCNC: 31 MG/DL (ref 8–23)
CALCIUM SERPL-MCNC: 9.6 MG/DL (ref 8.7–10.5)
CHLORIDE SERPL-SCNC: 102 MMOL/L (ref 95–110)
CO2 SERPL-SCNC: 21 MMOL/L (ref 23–29)
CREAT SERPL-MCNC: 1.1 MG/DL (ref 0.5–1.4)
ERYTHROCYTE [DISTWIDTH] IN BLOOD BY AUTOMATED COUNT: 17.8 % (ref 11.5–14.5)
EST. GFR  (NO RACE VARIABLE): 55 ML/MIN/1.73 M^2
GLUCOSE SERPL-MCNC: 260 MG/DL (ref 70–110)
HCT VFR BLD AUTO: 32.3 % (ref 37–48.5)
HGB BLD-MCNC: 9.8 G/DL (ref 12–16)
MCH RBC QN AUTO: 23.7 PG (ref 27–31)
MCHC RBC AUTO-ENTMCNC: 30.3 G/DL (ref 32–36)
MCV RBC AUTO: 78 FL (ref 82–98)
PLATELET # BLD AUTO: 219 K/UL (ref 150–450)
PMV BLD AUTO: 10.9 FL (ref 9.2–12.9)
POCT GLUCOSE: 209 MG/DL (ref 70–110)
POCT GLUCOSE: 209 MG/DL (ref 70–110)
POCT GLUCOSE: 228 MG/DL (ref 70–110)
POTASSIUM SERPL-SCNC: 4.1 MMOL/L (ref 3.5–5.1)
PROT SERPL-MCNC: 8 G/DL (ref 6–8.4)
RBC # BLD AUTO: 4.14 M/UL (ref 4–5.4)
SODIUM SERPL-SCNC: 135 MMOL/L (ref 136–145)
WBC # BLD AUTO: 7.13 K/UL (ref 3.9–12.7)

## 2022-11-17 PROCEDURE — 94761 N-INVAS EAR/PLS OXIMETRY MLT: CPT

## 2022-11-17 PROCEDURE — 27000221 HC OXYGEN, UP TO 24 HOURS

## 2022-11-17 PROCEDURE — 21400001 HC TELEMETRY ROOM

## 2022-11-17 PROCEDURE — 36415 COLL VENOUS BLD VENIPUNCTURE: CPT | Performed by: NURSE PRACTITIONER

## 2022-11-17 PROCEDURE — 63600175 PHARM REV CODE 636 W HCPCS: Performed by: NURSE PRACTITIONER

## 2022-11-17 PROCEDURE — 25000003 PHARM REV CODE 250: Performed by: STUDENT IN AN ORGANIZED HEALTH CARE EDUCATION/TRAINING PROGRAM

## 2022-11-17 PROCEDURE — 85027 COMPLETE CBC AUTOMATED: CPT | Performed by: NURSE PRACTITIONER

## 2022-11-17 PROCEDURE — 63600175 PHARM REV CODE 636 W HCPCS: Performed by: FAMILY MEDICINE

## 2022-11-17 PROCEDURE — 25000242 PHARM REV CODE 250 ALT 637 W/ HCPCS: Performed by: NURSE PRACTITIONER

## 2022-11-17 PROCEDURE — 25000003 PHARM REV CODE 250: Performed by: NURSE PRACTITIONER

## 2022-11-17 PROCEDURE — 99900035 HC TECH TIME PER 15 MIN (STAT)

## 2022-11-17 PROCEDURE — 25000003 PHARM REV CODE 250: Performed by: EMERGENCY MEDICINE

## 2022-11-17 PROCEDURE — 97165 OT EVAL LOW COMPLEX 30 MIN: CPT

## 2022-11-17 PROCEDURE — 94640 AIRWAY INHALATION TREATMENT: CPT

## 2022-11-17 PROCEDURE — 97110 THERAPEUTIC EXERCISES: CPT

## 2022-11-17 PROCEDURE — 63600175 PHARM REV CODE 636 W HCPCS: Performed by: EMERGENCY MEDICINE

## 2022-11-17 PROCEDURE — 80053 COMPREHEN METABOLIC PANEL: CPT | Performed by: NURSE PRACTITIONER

## 2022-11-17 RX ORDER — HYDRALAZINE HYDROCHLORIDE 20 MG/ML
10 INJECTION INTRAMUSCULAR; INTRAVENOUS EVERY 6 HOURS PRN
Status: DISCONTINUED | OUTPATIENT
Start: 2022-11-17 | End: 2022-11-18 | Stop reason: HOSPADM

## 2022-11-17 RX ORDER — SODIUM CHLORIDE 9 MG/ML
INJECTION, SOLUTION INTRAVENOUS
Status: DISCONTINUED | OUTPATIENT
Start: 2022-11-17 | End: 2022-11-18 | Stop reason: HOSPADM

## 2022-11-17 RX ADMIN — METHYLPREDNISOLONE SODIUM SUCCINATE 40 MG: 40 INJECTION, POWDER, FOR SOLUTION INTRAMUSCULAR; INTRAVENOUS at 09:11

## 2022-11-17 RX ADMIN — HYDRALAZINE HYDROCHLORIDE 10 MG: 20 INJECTION, SOLUTION INTRAMUSCULAR; INTRAVENOUS at 01:11

## 2022-11-17 RX ADMIN — SODIUM CHLORIDE: 0.9 INJECTION, SOLUTION INTRAVENOUS at 05:11

## 2022-11-17 RX ADMIN — AZITHROMYCIN MONOHYDRATE 500 MG: 500 INJECTION, POWDER, LYOPHILIZED, FOR SOLUTION INTRAVENOUS at 05:11

## 2022-11-17 RX ADMIN — INSULIN ASPART 4 UNITS: 100 INJECTION, SOLUTION INTRAVENOUS; SUBCUTANEOUS at 01:11

## 2022-11-17 RX ADMIN — LEVOTHYROXINE SODIUM 112 MCG: 0.11 TABLET ORAL at 05:11

## 2022-11-17 RX ADMIN — HYDROCODONE BITARTRATE AND ACETAMINOPHEN 1 TABLET: 5; 325 TABLET ORAL at 04:11

## 2022-11-17 RX ADMIN — ATORVASTATIN CALCIUM 40 MG: 40 TABLET, FILM COATED ORAL at 08:11

## 2022-11-17 RX ADMIN — GABAPENTIN 100 MG: 100 CAPSULE ORAL at 08:11

## 2022-11-17 RX ADMIN — BUDESONIDE 0.5 MG: 0.5 INHALANT ORAL at 08:11

## 2022-11-17 RX ADMIN — INSULIN ASPART 4 UNITS: 100 INJECTION, SOLUTION INTRAVENOUS; SUBCUTANEOUS at 08:11

## 2022-11-17 RX ADMIN — ENOXAPARIN SODIUM 40 MG: 100 INJECTION SUBCUTANEOUS at 04:11

## 2022-11-17 RX ADMIN — METHYLPREDNISOLONE SODIUM SUCCINATE 40 MG: 40 INJECTION, POWDER, FOR SOLUTION INTRAMUSCULAR; INTRAVENOUS at 01:11

## 2022-11-17 RX ADMIN — ARFORMOTEROL TARTRATE 15 MCG: 15 SOLUTION RESPIRATORY (INHALATION) at 08:11

## 2022-11-17 RX ADMIN — METHYLPREDNISOLONE SODIUM SUCCINATE 80 MG: 40 INJECTION, POWDER, FOR SOLUTION INTRAMUSCULAR; INTRAVENOUS at 09:11

## 2022-11-17 RX ADMIN — CLOPIDOGREL BISULFATE 75 MG: 75 TABLET ORAL at 09:11

## 2022-11-17 RX ADMIN — CEFTRIAXONE 1 G: 1 INJECTION, SOLUTION INTRAVENOUS at 05:11

## 2022-11-17 RX ADMIN — FERROUS SULFATE TAB 325 MG (65 MG ELEMENTAL FE) 1 EACH: 325 (65 FE) TAB at 09:11

## 2022-11-17 RX ADMIN — INSULIN ASPART 4 UNITS: 100 INJECTION, SOLUTION INTRAVENOUS; SUBCUTANEOUS at 04:11

## 2022-11-17 RX ADMIN — GABAPENTIN 100 MG: 100 CAPSULE ORAL at 09:11

## 2022-11-17 RX ADMIN — GABAPENTIN 100 MG: 100 CAPSULE ORAL at 04:11

## 2022-11-17 RX ADMIN — ASPIRIN 81 MG CHEWABLE TABLET 81 MG: 81 TABLET CHEWABLE at 09:11

## 2022-11-17 NOTE — PLAN OF CARE
SEE EVAL FOR DETAILS. PT DISPLAYED DEFICITS WITH ADL'S SKILLS,  FUNCTIONAL MOBILITY/ TRANSFERS, AND B UE STRENGTH/ENDURANCE . RECOMMEND : MARY O.T.

## 2022-11-17 NOTE — PLAN OF CARE
Aox4. Able to verbalize needs & follow commands. Calm & cooperative throughout shift.   POC reviewed with pt. Interventions implemented as appropriate.    VSS; SR on tele-monitor.  On 5L NC. Respiratory treatments as ordered.    PIV patent. Integrity maintained.    Receiving IV abx. No adverse reactions noted.  Skin WDI.    No c/o pain.    Continent of b/b.   CBG AC/HS.    Ambulates w/ independently. Activity ad sabi. Frequent position changes encouraged. Able to reposition in bed independently.  Educated on s/sx of pressure injury;  verbalized understanding.  NADN. Resting quietly in bed.   Free of falls. Hourly rounding complete.   All safety measures remain in place. SR up x2; bed low & locked. Call light w/in reach.   Will continue to monitor throughout shift.

## 2022-11-17 NOTE — SUBJECTIVE & OBJECTIVE
Interval History: pt weaned down to 4L NC. Will consult PT/OT on case. Cont rocephin and azithromycin. Decreased solumedrol to 40mg q8h. Can possibly dc tomorrow.     Review of Systems   Constitutional:  Negative for fatigue and fever.   HENT:  Negative for sinus pressure.    Eyes:  Negative for visual disturbance.   Respiratory:  Positive for shortness of breath.    Cardiovascular:  Negative for chest pain.   Gastrointestinal:  Negative for nausea and vomiting.   Genitourinary:  Negative for difficulty urinating.   Musculoskeletal:  Negative for back pain.   Skin:  Negative for rash.   Neurological:  Negative for headaches.   Psychiatric/Behavioral:  Negative for confusion.    Objective:     Vital Signs (Most Recent):  Temp: 97.8 °F (36.6 °C) (11/17/22 0810)  Pulse: 61 (11/17/22 0810)  Resp: 14 (11/17/22 0810)  BP: (!) 175/77 (11/17/22 0810)  SpO2: 96 % (11/17/22 0810)   Vital Signs (24h Range):  Temp:  [97.4 °F (36.3 °C)-99.2 °F (37.3 °C)] 97.8 °F (36.6 °C)  Pulse:  [59-69] 61  Resp:  [14-27] 14  SpO2:  [91 %-100 %] 96 %  BP: (113-180)/(58-78) 175/77     Weight: 103.5 kg (228 lb 2.8 oz)  Body mass index is 35.74 kg/m².    Intake/Output Summary (Last 24 hours) at 11/17/2022 1035  Last data filed at 11/17/2022 0300  Gross per 24 hour   Intake 120 ml   Output --   Net 120 ml      Physical Exam  Constitutional:       General: She is not in acute distress.     Appearance: She is well-developed. She is obese. She is not diaphoretic.   HENT:      Head: Normocephalic and atraumatic.   Eyes:      Pupils: Pupils are equal, round, and reactive to light.   Cardiovascular:      Rate and Rhythm: Normal rate and regular rhythm.      Heart sounds: Normal heart sounds. No murmur heard.    No friction rub. No gallop.   Pulmonary:      Effort: Pulmonary effort is normal. No respiratory distress.      Breath sounds: No stridor. Rhonchi present. No wheezing or rales.   Abdominal:      General: Bowel sounds are normal. There is no  distension.      Palpations: Abdomen is soft. There is no mass.      Tenderness: There is no abdominal tenderness. There is no guarding.   Musculoskeletal:      Right lower leg: No edema.      Left lower leg: No edema.   Skin:     General: Skin is warm.      Findings: No erythema.   Neurological:      Mental Status: She is alert and oriented to person, place, and time.       Significant Labs: All pertinent labs within the past 24 hours have been reviewed.    Significant Imaging: I have reviewed all pertinent imaging results/findings within the past 24 hours.

## 2022-11-17 NOTE — ASSESSMENT & PLAN NOTE
Patient with current diagnosis of pneumonia due to bacterial etiology due to  unspecified organism which is uncontrolled due to persistent hypoxia . I have reviewed the pertinent imaging. Current antimicrobial regimen consists of   Antibiotics (From admission, onward)    Start     Stop Route Frequency Ordered    11/16/22 0630  cefTRIAXone (ROCEPHIN) 1 g/50 mL D5W IVPB         -- IV Every 24 hours (non-standard times) 11/16/22 0629 11/16/22 0630  azithromycin 500 mg in dextrose 5 % 250 mL IVPB (ready to mix system)         -- IV Every 24 hours (non-standard times) 11/16/22 0629      . Cultures drawn and noted-   Microbiology Results (last 7 days)     Procedure Component Value Units Date/Time    Culture, Respiratory with Gram Stain [[id number]] Collected: 11/16/22 0712    Order Status: Completed Specimen: Respiratory from Sputum Updated: 11/17/22 0909     Respiratory Culture Normal respiratory anthony     Gram Stain (Respiratory) <10 epithelial cells per low power field.     Gram Stain (Respiratory) Few WBC's     Gram Stain (Respiratory) Many Gram positive cocci     Gram Stain (Respiratory) Many Gram negative rods     Gram Stain (Respiratory) Rare Gram positive rods    Blood culture x two cultures. Draw prior to antibiotics. [[id number]] Collected: 11/16/22 0618    Order Status: Completed Specimen: Blood from Peripheral, Antecubital, Right Updated: 11/16/22 1915     Blood Culture, Routine No Growth to date    Narrative:      Aerobic and anaerobic    Blood culture x two cultures. Draw prior to antibiotics. [[id number]] Collected: 11/16/22 0617    Order Status: Completed Specimen: Blood from Peripheral, Hand, Right Updated: 11/16/22 1915     Blood Culture, Routine No Growth to date    Narrative:      Aerobic and anaerobic    Influenza A & B by Molecular [[id number]] Collected: 11/16/22 0622    Order Status: Completed Specimen: Nasopharyngeal Swab Updated: 11/16/22 0727     Influenza A, Molecular Negative     Influenza B,  Molecular Negative     Flu A & B Source Nasal swab       Will monitor patient closely and continue current treatment plan unchanged.    11/17:  Cont rocephin and azithromycin  Wean O2 as tolerated  Home O2 eval in am   Will consult pt/ot on case

## 2022-11-17 NOTE — PROGRESS NOTES
[facility] Medicine  Progress Note    Patient Name: Chiquita Ridley  MRN: [id number]  Patient Class: IP- Inpatient   Admission Date: 11/16/2022  Length of Stay: 1 days  Attending Physician: Grant Busch MD  Primary Care Provider: Virgil Persaud MD        Subjective:     Principal Problem:Pneumonia of both lower lobes due to infectious organism        HPI:  Chiquita Ridley is a 66 year old female with PMHx of of chf, cad, hypothyroid, chronic anemia, copd, former smoker who presented to the Emergency Department this morning with shortness of breath, cough, and fever. Symptoms have been present for 1 week. Was seen at her PCP with cough and shortness of breath on 11/11, she tested negative for flu and COVID and was diagnosed with an upper respiratory infection and mild COPD exacerbation, was given trelegy sample and phenergan DM. Contacted PCP 2 days ago when she developed fever and was placed on prednisone taper, however symptoms worsened and ultimately led her to contacting 911. Family report her SpO2 saturations were 69-70% this morning and her temperature was 102.7. She presented via ambulance on a NRB. Symptoms have been constant and progressively worsening. Prior treatment includes albuterol, Trelegy, prednisone, phenergan DM, tylenol.      In Emergency Department noted to have hypoxic respiratory failure and bilateral pneumonia on CXR . Labs revealed normal lactic acid, urine uninfected, mild hyponatremia with Na 132, PATRICIA with BUN 38 and Creat 1.5, procalcitonin 2.10, slight anemia with H&H 9.9/ 31.7, troponin negative, blood cultures collected, BNP pending. Patient admitted to hospital medicine services for further treatment and evaluation.       Overview/Hospital Course:  11/17: pt weaned down to 4L NC. Will consult PT/OT on case. Cont rocephin and azithromycin. Decreased solumedrol to 40mg q8h. Can possibly dc tomorrow.       Interval History: pt weaned down to 4L NC. Will consult PT/OT  on case. Cont rocephin and azithromycin. Decreased solumedrol to 40mg q8h. Can possibly dc tomorrow.     Review of Systems   Constitutional:  Negative for fatigue and fever.   HENT:  Negative for sinus pressure.    Eyes:  Negative for visual disturbance.   Respiratory:  Positive for shortness of breath.    Cardiovascular:  Negative for chest pain.   Gastrointestinal:  Negative for nausea and vomiting.   Genitourinary:  Negative for difficulty urinating.   Musculoskeletal:  Negative for back pain.   Skin:  Negative for rash.   Neurological:  Negative for headaches.   Psychiatric/Behavioral:  Negative for confusion.    Objective:     Vital Signs (Most Recent):  Temp: 97.8 °F (36.6 °C) (11/17/22 0810)  Pulse: 61 (11/17/22 0810)  Resp: 14 (11/17/22 0810)  BP: (!) 175/77 (11/17/22 0810)  SpO2: 96 % (11/17/22 0810)   Vital Signs (24h Range):  Temp:  [97.4 °F (36.3 °C)-99.2 °F (37.3 °C)] 97.8 °F (36.6 °C)  Pulse:  [59-69] 61  Resp:  [14-27] 14  SpO2:  [91 %-100 %] 96 %  BP: (113-180)/(58-78) 175/77     Weight: 103.5 kg (228 lb 2.8 oz)  Body mass index is 35.74 kg/m².    Intake/Output Summary (Last 24 hours) at 11/17/2022 1035  Last data filed at 11/17/2022 0300  Gross per 24 hour   Intake 120 ml   Output --   Net 120 ml      Physical Exam  Constitutional:       General: She is not in acute distress.     Appearance: She is well-developed. She is obese. She is not diaphoretic.   HENT:      Head: Normocephalic and atraumatic.   Eyes:      Pupils: Pupils are equal, round, and reactive to light.   Cardiovascular:      Rate and Rhythm: Normal rate and regular rhythm.      Heart sounds: Normal heart sounds. No murmur heard.    No friction rub. No gallop.   Pulmonary:      Effort: Pulmonary effort is normal. No respiratory distress.      Breath sounds: No stridor. Rhonchi present. No wheezing or rales.   Abdominal:      General: Bowel sounds are normal. There is no distension.      Palpations: Abdomen is soft. There is no mass.       Tenderness: There is no abdominal tenderness. There is no guarding.   Musculoskeletal:      Right lower leg: No edema.      Left lower leg: No edema.   Skin:     General: Skin is warm.      Findings: No erythema.   Neurological:      Mental Status: She is alert and oriented to person, place, and time.       Significant Labs: All pertinent labs within the past 24 hours have been reviewed.    Significant Imaging: I have reviewed all pertinent imaging results/findings within the past 24 hours.        Assessment/Plan:      * Pneumonia of both lower lobes due to infectious organism  Patient with current diagnosis of pneumonia due to bacterial etiology due to  unspecified organism which is uncontrolled due to persistent hypoxia . I have reviewed the pertinent imaging. Current antimicrobial regimen consists of   Antibiotics (From admission, onward)    Start     Stop Route Frequency Ordered    11/16/22 0630  cefTRIAXone (ROCEPHIN) 1 g/50 mL D5W IVPB         -- IV Every 24 hours (non-standard times) 11/16/22 0629 11/16/22 0630  azithromycin 500 mg in dextrose 5 % 250 mL IVPB (ready to mix system)         -- IV Every 24 hours (non-standard times) 11/16/22 0629      . Cultures drawn and noted-   Microbiology Results (last 7 days)     Procedure Component Value Units Date/Time    Culture, Respiratory with Gram Stain [[id number]] Collected: 11/16/22 0712    Order Status: Completed Specimen: Respiratory from Sputum Updated: 11/17/22 0909     Respiratory Culture Normal respiratory anthony     Gram Stain (Respiratory) <10 epithelial cells per low power field.     Gram Stain (Respiratory) Few WBC's     Gram Stain (Respiratory) Many Gram positive cocci     Gram Stain (Respiratory) Many Gram negative rods     Gram Stain (Respiratory) Rare Gram positive rods    Blood culture x two cultures. Draw prior to antibiotics. [[id number]] Collected: 11/16/22 0618    Order Status: Completed Specimen: Blood from Peripheral, Antecubital, Right  Updated: 11/16/22 1915     Blood Culture, Routine No Growth to date    Narrative:      Aerobic and anaerobic    Blood culture x two cultures. Draw prior to antibiotics. [[id number]] Collected: 11/16/22 0617    Order Status: Completed Specimen: Blood from Peripheral, Hand, Right Updated: 11/16/22 1915     Blood Culture, Routine No Growth to date    Narrative:      Aerobic and anaerobic    Influenza A & B by Molecular [[id number]] Collected: 11/16/22 0622    Order Status: Completed Specimen: Nasopharyngeal Swab Updated: 11/16/22 0727     Influenza A, Molecular Negative     Influenza B, Molecular Negative     Flu A & B Source Nasal swab       Will monitor patient closely and continue current treatment plan unchanged.    11/17:  Cont rocephin and azithromycin  Wean O2 as tolerated  Home O2 eval in am   Will consult pt/ot on case    Acute hypoxemic respiratory failure  Patient with Hypoxic Respiratory failure which is Acute.  she is not on home oxygen. 69% SpO2 reportedly at home. Arrived with NRB per EMS. Supplemental oxygen provided- currently on 5 L to maintain o2 sats >93%. Signs/symptoms of respiratory failure include- tachypnea, increased work of breathing and respiratory distress. Contributing diagnoses includes - COPD and Bilateral Pneumonia. Labs and images were reviewed.Has recent ABG, which has been reviewed. Will treat underlying causes and adjust management of respiratory failure as needed.  Recent Labs     11/16/22 0624   PH 7.482*   PCO2 34.0*   PO2 63*   HCO3 25.4   POCSATURATED 94*   BE 2       History of Graves' disease  Continue synthroid  Will defer to pcp on dose adjustment     Iron deficiency anemia  Continue iron supplementation  Recent Labs from PCP:     Ref Range & Units 2 wk ago   TIBC 250 - 400 ug/dL 474 High     % Saturation 20 - 50 % 6 Low     Iron 40 - 150 ug/dL 29 L Low             Type 2 diabetes mellitus without complication, without long-term current use of insulin  .Patient's FSGs are  controlled on current medication regimen.  Last A1c reviewed-  Hemoglobin A1c  Specimen:  Blood - Venous structure (body structure)   Ref Range & Units 2 wk ago   Hgb A1c 4.2 - 5.8 % 6.7 High     EAG (mg/dl)  137.11    Resulting Agency  [facility]       Most recent fingerstick glucose reviewed-   Recent Labs   Lab 11/16/22  0624   POCTGLUCOSE 128*     Current correctional scale  Low  Maintain anti-hyperglycemic dose as follows-   Antihyperglycemics (From admission, onward)    Start     Stop Route Frequency Ordered    11/16/22 1154  insulin aspart U-100 pen 1-10 Units         -- SubQ Before meals & nightly PRN 11/16/22 1059        Hold Oral hypoglycemics while patient is in the hospital.    KERA (obstructive sleep apnea)  CPAP HS      Atherosclerosis of native coronary artery of native heart without angina pectoris  Patient with known CAD s/p stent placement, which is controlled Will continue ASA, Plavix and Statin and monitor for S/Sx of angina/ACS. Continue to monitor on telemetry.         Chronic obstructive pulmonary disease with acute exacerbation  With bilateral pneumonia  Pulse ox continuous  Budesonide (ICS) and formoterol (LABA) q 12 hours  Duoneb PRN q 6  PRN O2 to maintain SPO2 >94%  Solumedrol IV        Chronic diastolic (congestive) heart failure  Patient is identified as having Diastolic (HFpEF) heart failure that is Chronic. CHF is currently controlled. Latest ECHO (11/2020) demonstrated-  CONCLUSIONS:   1. Normal left ventricular cavity size. Normal left ventricular systolic function. LVEF   55 - 65%. Mild concentric hypertrophy.   2. Mildly dilated left atrium.   3. Trivial mitral regurgitation. No mitral valve stenosis. Mitral valve leaflets appear   mildly calcified.   4. No aortic valve regurgitation seen. No aortic valve stenosis. AV peak PG 14 mmHg. AV   Mean PG 7 mmHg.   5. Trivial tricuspid valve regurgitation. The estimated right ventricular systolic   pressure/PASP is <35  mmHg.   6. Trivial pulmonic valve regurgitation.   7. Visualized portions of the aortic root and proximal aorta appear normal in size and   contour.    Continue Beta Blocker, ACE/ARB and Furosemide and monitor clinical status closely. Monitor on telemetry. Patient is off CHF pathway.  Monitor strict Is&Os and daily weights.  Place on fluid restriction of 1.5 L. Continue to stress to patient importance of self efficacy and  on diet for CHF.   BNP Pending:  Recent Labs   Lab 11/16/22  0615   BNP 32   .      Essential (primary) hypertension  Chronic, controlled-hypotensive currently.  Latest blood pressure and vitals reviewed-   Temp:  [98.6 °F (37 °C)-100.4 °F (38 °C)]   Pulse:  [53-74]   Resp:  [17-30]   BP: ()/(50-67)   SpO2:  [91 %-97 %] .   Home meds for hypertension were reviewed and noted below.   Hypertension Medications             amLODIPine (NORVASC) 5 MG tablet Take 5 mg by mouth once daily.    enalapril (VASOTEC) 10 MG tablet Take 10 mg by mouth once daily.    furosemide (LASIX) 40 MG tablet Take 40 mg by mouth once daily.    losartan (COZAAR) 100 MG tablet Take 100 mg by mouth once daily.    metoprolol succinate (TOPROL-XL) 25 MG 24 hr tablet Take 25 mg by mouth once daily.    metoprolol tartrate (LOPRESSOR) 50 MG tablet Take 50 mg by mouth 2 (two) times a day.    SPIRONOLACTONE ORAL Take by mouth.          While in the hospital, will manage blood pressure as follows; Adjust home antihypertensive regimen as follows- hold due to hypotension- restart when able    Will utilize p.r.n. blood pressure medication only if patient's systolic blood pressure greater than 180      VTE Risk Mitigation (From admission, onward)         Ordered     enoxaparin injection 40 mg  Daily         11/16/22 1059     IP VTE HIGH RISK PATIENT  Once         11/16/22 1059     Place sequential compression device  Until discontinued         11/16/22 1059                Discharge Planning   KEEGAN: 11/19/2022     Code Status:  Full Code   Is the patient medically ready for discharge?:     Reason for patient still in hospital (select all that apply): Patient trending condition                     Grant Busch MD  Department of Hospital Medicine   [facility] - Telemetry ([facility])

## 2022-11-17 NOTE — PT/OT/SLP EVAL
Occupational Therapy   Evaluation    Name: Chiquita Ridley  MRN: [id number]  Admitting Diagnosis:  Pneumonia of both lower lobes due to infectious organism  Recent Surgery: * No surgery found *      Recommendations:     Discharge Recommendations: home health OT  Discharge Equipment Recommendations:  none  Barriers to discharge:       Assessment:     Chiquita Ridley is a 66 y.o. female with a medical diagnosis of Pneumonia of both lower lobes due to infectious organism.  She presents with DEBILITY AND GENERALIZED WEAKNESS. Performance deficits affecting function: weakness, gait instability, decreased upper extremity function, impaired endurance, impaired balance, impaired self care skills, impaired functional mobility.      Rehab Prognosis: Good; patient would benefit from acute skilled OT services to address these deficits and reach maximum level of function.       Plan:     Patient to be seen 2 x/week to address the above listed problems via self-care/home management, therapeutic activities, therapeutic exercises  Plan of Care Expires:    Plan of Care Reviewed with: patient, friend    Subjective     Chief Complaint: DEBILITY AND GENERALIZED WEAKNESS  Patient/Family Comments/goals:     Occupational Profile:  Living Environment: LIVES WITH FAMILY ON FIRST FLOOR APARTMENT WITH 1 CURB STEP UP  Previous level of function: (I) WITH ADL'S AND MOD (I) TO (I) WITH FUNCTIONAL MOBILITY / TRANSFER. PT REPORTED SCARED TO USE THE SPC SHE HAVE AT TIME. PT ALSO REPORTS STILL WORKS AND DRIVES  Roles and Routines: OCCUPATIONAL THERAPY  Equipment Used at Home:    Assistance upon Discharge:     Pain/Comfort:  Pain Rating 1: 0/10    Patients cultural, spiritual, Restorationism conflicts given the current situation:      Objective:     Communicated with: NURSE AND Epic CHART REVIEW prior to session.  Patient found up in chair with peripheral IV, telemetry upon OT entry to room.    General Precautions: Standard, fall   Orthopedic  Precautions:N/A   Braces: N/A  Respiratory Status: Nasal cannula, flow 2 L/min    Occupational Performance:    Functional Mobility/Transfers:  Patient completed Sit <> Stand Transfer with minimum assistance  with  hand-held assist   Functional Mobility: MIN A WITH HAND HELD ASSIST AT TIMES WITH PREGAIT TASK ( [facility] PLACE)    Activities of Daily Living:  Lower Body Dressing: contact guard assistance SOCKS    Cognitive/Visual Perceptual:  Cognitive/Psychosocial Skills:     -       Oriented to: Person, Place, Time, and Situation   -       Follows Commands/attention:Follows multistep  commands  -       Communication: clear/fluent  -       Memory: No Deficits noted  -       Safety awareness/insight to disability: impaired     Physical Exam:  Upper Extremity Range of Motion:     -       Right Upper Extremity: WFL  -       Left Upper Extremity: WFL  Upper Extremity Strength:    -       Right Upper Extremity: MMT: 3+/5 grossly  -       Left Upper Extremity: mmt: 3+/5 grossly   Strength:    -       Right Upper Extremity: mmt: 3+/5 grossly  -       Left Upper Extremity: mmt: 3=/5 grossly    AMPAC 6 Click ADL:  AMPAC Total Score: 20    Treatment & Education:  Patient educated on role of OT in acute setting and benefits of participation. Educated on techniques to use to increase independence and decrease fall risk with functional transfers. Educated on importance of OOB activity and calling for A to transfer back to bed. Encouraged completion of B UE AROM therex throughout the day to tolerance to increase functional strength and activity tolerance. Patient stated understanding and in agreement with POC.      Patient left up in chair with all lines intact, call button in reach, nurse notified, and friend present    GOALS:   Multidisciplinary Problems       Occupational Therapy Goals          Problem: Occupational Therapy    Goal Priority Disciplines Outcome Interventions   Occupational Therapy Goal     OT, PT/OT      Description: O.T GOALS TO BE MET BY 12-1-22  MOD (I) WITH LE DRESSING  PT WILL TOLERATE 1 SET X 15 REPS B UE ROM EXERCISE WITH MIN RESISTANCE  MOD (I) WITH TOILET TRANSFERS                         History:     Past Medical History:   Diagnosis Date    Allergy     Arthritis     Diabetes mellitus, type 2     Hyperlipidemia     Hypertension          Past Surgical History:   Procedure Laterality Date    CAROTID STENT      HYSTERECTOMY         Time Tracking:     OT Date of Treatment: 11/17/22  OT Start Time: 1521  OT Stop Time: 1544  OT Total Time (min): 23 min    Billable Minutes:Evaluation 13 minutes  Therapeutic Activity 10 minutes    11/17/2022

## 2022-11-17 NOTE — HOSPITAL COURSE
Patient was admitted for respiratory failure secondary to pna. She was started on O2 and weaned down to room air on day of discharge. Home o2 eval performed and patient did not qualify. Augmentin was started upon discharge. Recommended f/u with pcp.

## 2022-11-17 NOTE — PLAN OF CARE
[facility] - Telemetry (Hospital)  Initial Discharge Assessment    SW met with patient and friend at the bedside to complete discharge assessment.     PLOF: Pt lives at home with daughterRosalva  Help at Home: DTR  DME: walker, CPAP  Transportation: friend  Comments: Patient would like a rollator and BSC at discharge. Patient mentioned possible home health at d/c per MD.     Pt's whiteboard updated to reflect discharge disposition and SW contact.     Primary Care Provider: Virgil Persaud MD    Admission Diagnosis: Shortness of breath [R06.02]  Pneumonia of both lower lobes due to infectious organism [J18.9]  Acute hypoxemic respiratory failure [J96.01]  Sepsis, due to unspecified organism, unspecified whether acute organ dysfunction present [A41.9]    Admission Date: 11/16/2022  Expected Discharge Date: 11/19/2022    Discharge Barriers Identified: None    Payor: HU[employer] MANAGED MEDICARE / Plan: HU[employer]NA MEDICARE BR CLINIC BR GEN / Product Type: Medicare Advantage /     Extended Emergency Contact Information  Primary Emergency Contact: Lejeune,Meri Jo   [facility]  Home Phone: [phone]  Relation: Daughter    Discharge Plan A: Home, Home with family         [facility] - [address] [address]  Phone: [phone] Fax: [phone]      Initial Assessment (most recent)       Adult Discharge Assessment - 11/17/22 1452          Discharge Assessment    Assessment Type Discharge Planning Assessment     Confirmed/corrected address, phone number and insurance Yes     Confirmed Demographics Correct on Facesheet     Source of Information patient     Communicated KEEGAN with patient/caregiver Date not available/Unable to determine     Reason For Admission Pneumonia of both lower lobes due to infectious organism     Lives With child(dia), adult     Facility Arrived From: Home     Do you expect to return to your current living situation? Yes     Do you  have help at home or someone to help you manage your care at home? Yes     Who are your caregiver(s) and their phone number(s)? Pt's daughter, Rosalva Garcia [phone]     Prior to hospitilization cognitive status: Alert/Oriented     Current cognitive status: Alert/Oriented     Walking or Climbing Stairs Difficulty ambulation difficulty, requires equipment     Mobility Management rolling walker     Equipment Currently Used at Home rollator;bedside commode;CPAP     Readmission within 30 days? No     Patient currently being followed by outpatient case management? No     Do you currently have service(s) that help you manage your care at home? No     Do you take prescription medications? Yes     Do you have prescription coverage? Yes     Do you have any problems affording any of your prescribed medications? No     Is the patient taking medications as prescribed? yes     Who is going to help you get home at discharge? Pt's daughter     How do you get to doctors appointments? family or friend will provide     Are you on dialysis? No     Do you take coumadin? No     Discharge Plan A Home;Home with family     DME Needed Upon Discharge  rollator;bedside commode     Discharge Plan discussed with: Patient     Discharge Barriers Identified None

## 2022-11-18 VITALS
OXYGEN SATURATION: 90 % | DIASTOLIC BLOOD PRESSURE: 73 MMHG | WEIGHT: 238.13 LBS | BODY MASS INDEX: 37.37 KG/M2 | HEART RATE: 58 BPM | RESPIRATION RATE: 20 BRPM | TEMPERATURE: 98 F | HEIGHT: 67 IN | SYSTOLIC BLOOD PRESSURE: 158 MMHG

## 2022-11-18 LAB
ALBUMIN SERPL BCP-MCNC: 2.5 G/DL (ref 3.5–5.2)
ALP SERPL-CCNC: 61 U/L (ref 55–135)
ALT SERPL W/O P-5'-P-CCNC: 87 U/L (ref 10–44)
ANION GAP SERPL CALC-SCNC: 11 MMOL/L (ref 8–16)
AST SERPL-CCNC: 90 U/L (ref 10–40)
BACTERIA SPEC AEROBE CULT: NORMAL
BACTERIA SPEC AEROBE CULT: NORMAL
BILIRUB SERPL-MCNC: 0.3 MG/DL (ref 0.1–1)
BUN SERPL-MCNC: 33 MG/DL (ref 8–23)
CALCIUM SERPL-MCNC: 9.1 MG/DL (ref 8.7–10.5)
CHLORIDE SERPL-SCNC: 101 MMOL/L (ref 95–110)
CO2 SERPL-SCNC: 26 MMOL/L (ref 23–29)
CREAT SERPL-MCNC: 0.9 MG/DL (ref 0.5–1.4)
ERYTHROCYTE [DISTWIDTH] IN BLOOD BY AUTOMATED COUNT: 17.9 % (ref 11.5–14.5)
EST. GFR  (NO RACE VARIABLE): >60 ML/MIN/1.73 M^2
GLUCOSE SERPL-MCNC: 191 MG/DL (ref 70–110)
GRAM STN SPEC: NORMAL
HCT VFR BLD AUTO: 32 % (ref 37–48.5)
HGB BLD-MCNC: 9.9 G/DL (ref 12–16)
MCH RBC QN AUTO: 24.3 PG (ref 27–31)
MCHC RBC AUTO-ENTMCNC: 30.9 G/DL (ref 32–36)
MCV RBC AUTO: 79 FL (ref 82–98)
PLATELET # BLD AUTO: 261 K/UL (ref 150–450)
PMV BLD AUTO: 10.9 FL (ref 9.2–12.9)
POCT GLUCOSE: 197 MG/DL (ref 70–110)
POCT GLUCOSE: 213 MG/DL (ref 70–110)
POCT GLUCOSE: 241 MG/DL (ref 70–110)
POTASSIUM SERPL-SCNC: 4.2 MMOL/L (ref 3.5–5.1)
PROT SERPL-MCNC: 7.1 G/DL (ref 6–8.4)
RBC # BLD AUTO: 4.07 M/UL (ref 4–5.4)
SODIUM SERPL-SCNC: 138 MMOL/L (ref 136–145)
WBC # BLD AUTO: 7.85 K/UL (ref 3.9–12.7)

## 2022-11-18 PROCEDURE — 94618 PULMONARY STRESS TESTING: CPT

## 2022-11-18 PROCEDURE — 63700000 PHARM REV CODE 250 ALT 637 W/O HCPCS: Performed by: FAMILY MEDICINE

## 2022-11-18 PROCEDURE — 36415 COLL VENOUS BLD VENIPUNCTURE: CPT | Performed by: NURSE PRACTITIONER

## 2022-11-18 PROCEDURE — 85027 COMPLETE CBC AUTOMATED: CPT | Performed by: NURSE PRACTITIONER

## 2022-11-18 PROCEDURE — 25000003 PHARM REV CODE 250: Performed by: NURSE PRACTITIONER

## 2022-11-18 PROCEDURE — 97161 PT EVAL LOW COMPLEX 20 MIN: CPT

## 2022-11-18 PROCEDURE — 94761 N-INVAS EAR/PLS OXIMETRY MLT: CPT

## 2022-11-18 PROCEDURE — 94640 AIRWAY INHALATION TREATMENT: CPT

## 2022-11-18 PROCEDURE — 99900035 HC TECH TIME PER 15 MIN (STAT)

## 2022-11-18 PROCEDURE — 63600175 PHARM REV CODE 636 W HCPCS: Performed by: FAMILY MEDICINE

## 2022-11-18 PROCEDURE — 27000221 HC OXYGEN, UP TO 24 HOURS

## 2022-11-18 PROCEDURE — 63600175 PHARM REV CODE 636 W HCPCS: Performed by: EMERGENCY MEDICINE

## 2022-11-18 PROCEDURE — 97530 THERAPEUTIC ACTIVITIES: CPT

## 2022-11-18 PROCEDURE — 80053 COMPREHEN METABOLIC PANEL: CPT | Performed by: NURSE PRACTITIONER

## 2022-11-18 PROCEDURE — 25000242 PHARM REV CODE 250 ALT 637 W/ HCPCS: Performed by: NURSE PRACTITIONER

## 2022-11-18 RX ORDER — AZITHROMYCIN 250 MG/1
500 TABLET, FILM COATED ORAL
Status: DISCONTINUED | OUTPATIENT
Start: 2022-11-18 | End: 2022-11-18 | Stop reason: HOSPADM

## 2022-11-18 RX ORDER — PREDNISONE 20 MG/1
20 TABLET ORAL 2 TIMES DAILY
Qty: 10 TABLET | Refills: 0 | Status: SHIPPED | OUTPATIENT
Start: 2022-11-18 | End: 2022-11-23

## 2022-11-18 RX ORDER — AMOXICILLIN AND CLAVULANATE POTASSIUM 875; 125 MG/1; MG/1
1 TABLET, FILM COATED ORAL EVERY 12 HOURS
Qty: 10 TABLET | Refills: 0 | Status: SHIPPED | OUTPATIENT
Start: 2022-11-18 | End: 2022-11-23

## 2022-11-18 RX ADMIN — CLOPIDOGREL BISULFATE 75 MG: 75 TABLET ORAL at 08:11

## 2022-11-18 RX ADMIN — LEVOTHYROXINE SODIUM 112 MCG: 0.11 TABLET ORAL at 05:11

## 2022-11-18 RX ADMIN — ARFORMOTEROL TARTRATE 15 MCG: 15 SOLUTION RESPIRATORY (INHALATION) at 07:11

## 2022-11-18 RX ADMIN — CEFTRIAXONE 1 G: 1 INJECTION, SOLUTION INTRAVENOUS at 05:11

## 2022-11-18 RX ADMIN — FERROUS SULFATE TAB 325 MG (65 MG ELEMENTAL FE) 1 EACH: 325 (65 FE) TAB at 08:11

## 2022-11-18 RX ADMIN — BUDESONIDE 0.5 MG: 0.5 INHALANT ORAL at 07:11

## 2022-11-18 RX ADMIN — INSULIN ASPART 4 UNITS: 100 INJECTION, SOLUTION INTRAVENOUS; SUBCUTANEOUS at 12:11

## 2022-11-18 RX ADMIN — METHYLPREDNISOLONE SODIUM SUCCINATE 40 MG: 40 INJECTION, POWDER, FOR SOLUTION INTRAMUSCULAR; INTRAVENOUS at 05:11

## 2022-11-18 RX ADMIN — HYDRALAZINE HYDROCHLORIDE 10 MG: 20 INJECTION, SOLUTION INTRAMUSCULAR; INTRAVENOUS at 08:11

## 2022-11-18 RX ADMIN — HYDROCODONE BITARTRATE AND ACETAMINOPHEN 1 TABLET: 5; 325 TABLET ORAL at 12:11

## 2022-11-18 RX ADMIN — ASPIRIN 81 MG CHEWABLE TABLET 81 MG: 81 TABLET CHEWABLE at 08:11

## 2022-11-18 RX ADMIN — Medication 6 MG: at 12:11

## 2022-11-18 RX ADMIN — GABAPENTIN 100 MG: 100 CAPSULE ORAL at 08:11

## 2022-11-18 RX ADMIN — AZITHROMYCIN MONOHYDRATE 500 MG: 250 TABLET ORAL at 05:11

## 2022-11-18 NOTE — PLAN OF CARE
Pt AAOx4. NSR on the heart monitor. On 2L NC; tolerating well. Pt voids spontaneously without difficulty. Pt turned and repositioned independently with use of pillows. 20G PIVs in right AC and right hand CDI with no redness, swelling, warmth, or drainage saline locked. Blood glucose monitored. Bed low, wheels locked, alarms audible. Plan of care reviewed. Vital signs monitored. Fall precautions in place. Pain assessed. Safety promoted. Infection risks reduced.

## 2022-11-18 NOTE — PROGRESS NOTES
Pharmacist Intervention IV to PO Note    Chiquita Ridley is a 66 y.o. female being treated with IV medication azithromycin    Patient Data:    Vital Signs (Most Recent):  Temp: 97.9 °F (36.6 °C) (11/18/22 0021)  Pulse: 70 (11/18/22 0021)  Resp: 19 (11/18/22 0021)  BP: 137/62 (11/18/22 0021)  SpO2: (!) 92 % (11/18/22 0021)   Vital Signs (72h Range):  Temp:  [97.3 °F (36.3 °C)-100.4 °F (38 °C)]   Pulse:  [53-74]   Resp:  [14-30]   BP: ()/(50-87)   SpO2:  [91 %-100 %]      CBC:  Recent Labs   Lab 11/16/22  0615 11/17/22  0352   WBC 8.51 7.13   RBC 4.16 4.14   HGB 9.9* 9.8*   HCT 31.7* 32.3*    219   MCV 76* 78*   MCH 23.8* 23.7*   MCHC 31.2* 30.3*     CMP:     Recent Labs   Lab 11/16/22  0615 11/17/22  0352   * 260*   CALCIUM 8.7 9.6   ALBUMIN 2.8* 2.8*   PROT 7.5 8.0   * 135*   K 3.6 4.1   CO2 22* 21*   CL 98 102   BUN 38* 31*   CREATININE 1.5* 1.1   ALKPHOS 52* 68   ALT 41 55*   AST 71* 80*   BILITOT 0.5 0.4       Dietary Orders:  Diet Orders            Diet diabetic [facility]; 2000 Calorie; Fluid - 1500mL: Diabetic starting at 11/16 1055            Based on the following criteria, this patient qualifies for intravenous to oral conversion:  [x] The patients gastrointestinal tract is functioning (tolerating medications via oral or enteral route for 24 hours and tolerating food or enteral feeds for 24 hours).  [x] The patient is hemodynamically stable for 24 hours (heart rate <100 beats per minute, systolic blood pressure >99 mm Hg, and respiratory rate <20 breaths per minute).  [x] The patient shows clinical improvement (afebrile for at least 24 hours and white blood cell count downtrending or normalized). Additionally, the patient must be non-neutropenic (absolute neutrophil count >500 cells/mm3).  [x] For antimicrobials, the patient has received IV therapy for at least 24 hours.    IV medication Azithromycin 500 mg IV every 24 hours will be changed to oral medication azithromycin  500 mg every 24 hours    Pharmacist's Name: Zahc Bonner  Pharmacist's Extension: [phone]

## 2022-11-18 NOTE — DISCHARGE SUMMARY
[facility] - Telemetry ([facility])  [facility] Medicine  Discharge Summary      Patient Name: Chiquita Ridley  MRN: [id number]  DHARA: [id number]  Patient Class: IP- Inpatient  Admission Date: 11/16/2022  Hospital Length of Stay: 2 days  Discharge Date and Time: 11/18/2022  1:00 PM  Attending Physician: Jenny att. providers found   Discharging Provider: Grant Busch MD  Primary Care Provider: Virgil Persaud MD    Primary Care Team: Networked reference to record PCT     HPI:   Chiquita Ridley is a 66 year old female with PMHx of of chf, cad, hypothyroid, chronic anemia, copd, former smoker who presented to the Emergency Department this morning with shortness of breath, cough, and fever. Symptoms have been present for 1 week. Was seen at her PCP with cough and shortness of breath on 11/11, she tested negative for flu and COVID and was diagnosed with an upper respiratory infection and mild COPD exacerbation, was given trelegy sample and phenergan DM. Contacted PCP 2 days ago when she developed fever and was placed on prednisone taper, however symptoms worsened and ultimately led her to contacting 911. Family report her SpO2 saturations were 69-70% this morning and her temperature was 102.7. She presented via ambulance on a NRB. Symptoms have been constant and progressively worsening. Prior treatment includes albuterol, Trelegy, prednisone, phenergan DM, tylenol.      In Emergency Department noted to have hypoxic respiratory failure and bilateral pneumonia on CXR . Labs revealed normal lactic acid, urine uninfected, mild hyponatremia with Na 132, PATRICIA with BUN 38 and Creat 1.5, procalcitonin 2.10, slight anemia with H&H 9.9/ 31.7, troponin negative, blood cultures collected, BNP pending. Patient admitted to hospital medicine services for further treatment and evaluation.       * No surgery found *      Hospital Course:   Patient was admitted for respiratory failure secondary to pna. She was started on O2 and weaned down to room air on day  "of discharge. Home o2 eval performed and patient did not qualify. Augmentin was started upon discharge. Recommended f/u with pcp.        Goals of Care Treatment Preferences:  Code Status: Full Code      Consults:     No new Assessment & Plan notes have been filed under this hospital service since the last note was generated.  Service: Hospital Medicine    Final Active Diagnoses:    Diagnosis Date Noted POA    PRINCIPAL PROBLEM:  Pneumonia of both lower lobes due to infectious organism [J18.9] 11/16/2022 Yes    Iron deficiency anemia [D50.9] 11/16/2022 Yes    Acute hypoxemic respiratory failure [J96.01] 11/16/2022 Yes    Chronic obstructive pulmonary disease with acute exacerbation [J44.1] 10/15/2021 Yes    KERA (obstructive sleep apnea) [G47.33] 01/10/2019 Yes    Chronic diastolic (congestive) heart failure [I50.32] 10/29/2018 Yes    History of Graves' disease [Z86.39] 03/16/2018 Yes    Atherosclerosis of native coronary artery of native heart without angina pectoris [I25.10] 11/10/2016 Yes    Essential (primary) hypertension [I10] 10/22/2014 Yes    Type 2 diabetes mellitus without complication, without long-term current use of insulin [E11.9] 05/17/2013 Yes      Problems Resolved During this Admission:       Discharged Condition: good    Disposition: Home or Self Care    Follow Up:   Follow-up Information     Virgil Persaud MD Follow up in 1 week(s).    Specialty: Internal Medicine  Contact information:  [address] [facility] [address] [phone]                       Patient Instructions:      3 IN 1 COMMODE FOR HOME USE     Order Specific Question Answer Comments   Type: Standard    Height: 5' 7" (1.702 m)    Weight: 108 kg (238 lb 1.6 oz)    Does patient have medical equipment at home? cane, straight    Length of need (1-99 months): 1      WALKER FOR HOME USE     Order Specific Question Answer Comments   Type of Walker: Rollator with brakes and/or seat    With wheels? Yes    Height: 5' 7" " (1.702 m)    Weight: 108 kg (238 lb 1.6 oz)    Length of need (1-99 months): 12    Does patient have medical equipment at home? cane, straight    Please check all that apply: Patient's condition impairs ambulation.      Ambulatory referral/consult to Home Health   Standing Status: Future   Referral Priority: Routine Referral Type: Home Health   Referral Reason: Specialty Services Required   Requested Specialty: Home Health Services   Number of Visits Requested: 1       Significant Diagnostic Studies: Labs:   BMP:   Recent Labs   Lab 11/17/22 0352 11/18/22  0536   * 191*   * 138   K 4.1 4.2    101   CO2 21* 26   BUN 31* 33*   CREATININE 1.1 0.9   CALCIUM 9.6 9.1    and CBC   Recent Labs   Lab 11/17/22 0352 11/18/22  0536   WBC 7.13 7.85   HGB 9.8* 9.9*   HCT 32.3* 32.0*    261       Pending Diagnostic Studies:     None         Medications:  Reconciled Home Medications:      Medication List      START taking these medications    amoxicillin-clavulanate 875-125mg 875-125 mg per tablet  Commonly known as: AUGMENTIN  Take 1 tablet by mouth every 12 (twelve) hours. for 5 days     predniSONE 20 MG tablet  Commonly known as: DELTASONE  Take 1 tablet (20 mg total) by mouth 2 (two) times daily. for 5 days        CONTINUE taking these medications    albuterol 90 mcg/actuation inhaler  Commonly known as: PROVENTIL/VENTOLIN HFA  Inhale 2 puffs into the lungs every 4 to 6 hours as needed for Wheezing.     amLODIPine 5 MG tablet  Commonly known as: NORVASC  Take 5 mg by mouth once daily.     aspirin 81 MG Chew  Take 81 mg by mouth once daily.     butalbital-acetaminophen-caffeine -40 mg -40 mg per tablet  Commonly known as: FIORICET, ESGIC  Take 1 tablet by mouth every 6 (six) hours as needed for Headaches.     clopidogreL 75 mg tablet  Commonly known as: PLAVIX  Take 75 mg by mouth once daily.     enalapril 10 MG tablet  Commonly known as: VASOTEC  Take 10 mg by mouth once daily.      EScitalopram oxalate 10 MG tablet  Commonly known as: LEXAPRO  Take 10 mg by mouth once daily.     fluticasone-salmeterol 100-50 mcg/dose 100-50 mcg/dose diskus inhaler  Commonly known as: ADVAIR  Inhale 1 puff into the lungs 2 (two) times a day.     furosemide 40 MG tablet  Commonly known as: LASIX  Take 40 mg by mouth once daily.     gabapentin 300 MG capsule  Commonly known as: NEURONTIN  Take 300 mg by mouth 3 (three) times daily.     HYDROcodone-acetaminophen  mg per tablet  Commonly known as: NORCO  Take 1 tablet by mouth every 8 (eight) hours as needed.     indomethacin 25 MG capsule  Commonly known as: INDOCIN  Take 25 mg by mouth 2 (two) times daily.     IRON (DRIED) ORAL  Take by mouth.     lamoTRIgine 100 MG tablet  Commonly known as: LAMICTAL  Take 100 mg by mouth 3 (three) times daily.     levothyroxine 112 MCG tablet  Commonly known as: SYNTHROID  Take 112 mcg by mouth once daily.     losartan 100 MG tablet  Commonly known as: COZAAR  Take 100 mg by mouth once daily.     LUMIGAN 0.01 % Drop  Generic drug: bimatoprost  Apply 1 drop to eye every evening.     meloxicam 7.5 MG tablet  Commonly known as: MOBIC  Take 7.5 mg by mouth once daily.     metFORMIN 500 MG tablet  Commonly known as: GLUCOPHAGE  Take 500 mg by mouth once daily.     metoprolol succinate 25 MG 24 hr tablet  Commonly known as: TOPROL-XL  Take 25 mg by mouth once daily.     metoprolol tartrate 50 MG tablet  Commonly known as: LOPRESSOR  Take 50 mg by mouth 2 (two) times a day.     rOPINIRole 1 MG tablet  Commonly known as: REQUIP  Take 1 mg by mouth 3 (three) times daily.     SPIRONOLACTONE ORAL  Take by mouth.     zolpidem 10 mg Tab  Commonly known as: AMBIEN  Take 5 mg by mouth nightly as needed.        STOP taking these medications    atorvastatin 40 MG tablet  Commonly known as: LIPITOR            Indwelling Lines/Drains at time of discharge:   Lines/Drains/Airways     None                 Time spent on the discharge of  patient: 37 minutes         Grant Busch MD  Department of Hospital Medicine  '[place] - Telemetry ([facility])

## 2022-11-18 NOTE — PLAN OF CARE
11/18/22 1057   Post-Acute Status   Post-Acute Authorization HME;Home Health   HME Status Referrals Sent   Home Health Status Referrals Sent   Discharge Delays None known at this time   Discharge Plan   Discharge Plan A Home;Home Health     SW spoke with patient regarding DME orders: rollator and BSC. Referral being processed with [facility].   Patient has no home health preference. SW to send referrals to in-network options per [employer] website. SW to f/u on accepting agency.

## 2022-11-18 NOTE — PLAN OF CARE
Went over discharge instructions with patient.   Stressed importance of making and keeping all follow ups as well as making prescribed medication changes.   Prescriptions delivered to pt bedside via [facility] prior to discharge.  DME delivered prior to discharge.  Patient verbalized understanding and has had all questions in regards to discharge answered to satisfaction.  IV removed without complications.  Telemetry box removed and returned to monitor tech.  Patient transported via wheelchair to personal transportation at main entrance.

## 2022-11-18 NOTE — RESPIRATORY THERAPY
Home Oxygen Evaluation - [facility] - Cardiopulmonary Department      Date Performed: 11/18/2022      1) Patient's Home O2 Sat on room air, while at rest: Room Air SpO2 At Rest: (!) 89 %        If O2 sats on room air at rest are 88% or below, patient qualifies.  Document O2 liter flow needed in Step 2.  If O2 sats are 89% or above, complete Step 3.        2)  If patient is not ambulated and O2 sats are <88%, what is the O2 liter flow required to meet ordered saturation?      If O2 sats on room air while exercising remain 89% or above patient does not qualify, no further testing needed Document N/A in step 3. If O2 sats on room air while exercising are 88% or below, continue to Step 4.    3) Patient's O2 Sat on room air while exercising: Room Air SpO2 During Ambulation: 93 %        4) Patient's O2 Sat while exercising on O2:   at           (Must show improvement from #4 for patients to qualify)

## 2022-11-21 LAB
BACTERIA BLD CULT: NORMAL
BACTERIA BLD CULT: NORMAL

## 2023-02-20 PROBLEM — J96.01 ACUTE HYPOXEMIC RESPIRATORY FAILURE: Status: RESOLVED | Noted: 2022-11-16 | Resolved: 2023-02-20

## 2023-02-20 PROBLEM — J18.9 PNEUMONIA OF BOTH LOWER LOBES DUE TO INFECTIOUS ORGANISM: Status: RESOLVED | Noted: 2022-11-16 | Resolved: 2023-02-20
